# Patient Record
Sex: FEMALE | Race: WHITE | Employment: OTHER | URBAN - METROPOLITAN AREA
[De-identification: names, ages, dates, MRNs, and addresses within clinical notes are randomized per-mention and may not be internally consistent; named-entity substitution may affect disease eponyms.]

---

## 2018-04-05 ENCOUNTER — HOSPITAL ENCOUNTER (OUTPATIENT)
Age: 73
Discharge: HOME | End: 2018-04-07
Payer: MEDICARE

## 2018-04-05 LAB
ALBUMIN SERPL-MCNC: 4.2 G/DL (ref 3.5–5.2)
ALP BLD-CCNC: 59 U/L (ref 35–104)
ALT SERPL-CCNC: 16 U/L (ref 0–32)
ANION GAP SERPL CALCULATED.3IONS-SCNC: 9 MMOL/L (ref 7–16)
AST SERPL-CCNC: 21 U/L (ref 0–31)
BASOPHILS ABSOLUTE: 0.03 E9/L (ref 0–0.2)
BASOPHILS RELATIVE PERCENT: 0.6 % (ref 0–2)
BILIRUB SERPL-MCNC: 0.4 MG/DL (ref 0–1.2)
BUN BLDV-MCNC: 19 MG/DL (ref 8–23)
CALCIUM SERPL-MCNC: 9.5 MG/DL (ref 8.6–10.2)
CHLORIDE BLD-SCNC: 100 MMOL/L (ref 98–107)
CHOLESTEROL, TOTAL: 190 MG/DL (ref 0–199)
CO2: 28 MMOL/L (ref 22–29)
CREAT SERPL-MCNC: 0.7 MG/DL (ref 0.5–1)
EOSINOPHILS ABSOLUTE: 0.23 E9/L (ref 0.05–0.5)
EOSINOPHILS RELATIVE PERCENT: 4.9 % (ref 0–6)
GFR AFRICAN AMERICAN: >60
GFR NON-AFRICAN AMERICAN: >60 ML/MIN/1.73
GLUCOSE BLD-MCNC: 103 MG/DL (ref 74–109)
HCT VFR BLD CALC: 53 % (ref 34–48)
HDLC SERPL-MCNC: 56 MG/DL
HEMOGLOBIN: 16.3 G/DL (ref 11.5–15.5)
IMMATURE GRANULOCYTES #: 0.01 E9/L
IMMATURE GRANULOCYTES %: 0.2 % (ref 0–5)
LDL CHOLESTEROL CALCULATED: 120 MG/DL (ref 0–99)
LYMPHOCYTES ABSOLUTE: 0.99 E9/L (ref 1.5–4)
LYMPHOCYTES RELATIVE PERCENT: 21.1 % (ref 20–42)
MCH RBC QN AUTO: 27.5 PG (ref 26–35)
MCHC RBC AUTO-ENTMCNC: 30.8 % (ref 32–34.5)
MCV RBC AUTO: 89.4 FL (ref 80–99.9)
MONOCYTES ABSOLUTE: 0.28 E9/L (ref 0.1–0.95)
MONOCYTES RELATIVE PERCENT: 6 % (ref 2–12)
NEUTROPHILS ABSOLUTE: 3.15 E9/L (ref 1.8–7.3)
NEUTROPHILS RELATIVE PERCENT: 67.2 % (ref 43–80)
PDW BLD-RTO: 12.5 FL (ref 11.5–15)
PLATELET # BLD: 139 E9/L (ref 130–450)
PMV BLD AUTO: 11.9 FL (ref 7–12)
POTASSIUM SERPL-SCNC: 4.5 MMOL/L (ref 3.5–5)
RBC # BLD: 5.93 E12/L (ref 3.5–5.5)
SODIUM BLD-SCNC: 137 MMOL/L (ref 132–146)
TOTAL PROTEIN: 6.8 G/DL (ref 6.4–8.3)
TRIGL SERPL-MCNC: 70 MG/DL (ref 0–149)
TSH SERPL DL<=0.05 MIU/L-ACNC: 4.24 UIU/ML (ref 0.27–4.2)
VLDLC SERPL CALC-MCNC: 14 MG/DL
WBC # BLD: 4.7 E9/L (ref 4.5–11.5)

## 2018-04-05 PROCEDURE — 80061 LIPID PANEL: CPT

## 2018-04-05 PROCEDURE — 84443 ASSAY THYROID STIM HORMONE: CPT

## 2018-04-05 PROCEDURE — 85025 COMPLETE CBC W/AUTO DIFF WBC: CPT

## 2018-04-05 PROCEDURE — 80053 COMPREHEN METABOLIC PANEL: CPT

## 2018-10-03 ENCOUNTER — HOSPITAL ENCOUNTER (OUTPATIENT)
Age: 73
Discharge: HOME | End: 2018-10-05
Payer: MEDICARE

## 2018-10-03 LAB
ALBUMIN SERPL-MCNC: 3.9 G/DL (ref 3.5–5.2)
ALP BLD-CCNC: 54 U/L (ref 35–104)
ALT SERPL-CCNC: 17 U/L (ref 0–32)
ANION GAP SERPL CALCULATED.3IONS-SCNC: 12 MMOL/L (ref 7–16)
AST SERPL-CCNC: 21 U/L (ref 0–31)
BASOPHILS ABSOLUTE: 0.02 E9/L (ref 0–0.2)
BASOPHILS RELATIVE PERCENT: 0.4 % (ref 0–2)
BILIRUB SERPL-MCNC: 0.5 MG/DL (ref 0–1.2)
BUN BLDV-MCNC: 20 MG/DL (ref 8–23)
CALCIUM SERPL-MCNC: 9.4 MG/DL (ref 8.6–10.2)
CHLORIDE BLD-SCNC: 99 MMOL/L (ref 98–107)
CHOLESTEROL, TOTAL: 154 MG/DL (ref 0–199)
CO2: 28 MMOL/L (ref 22–29)
CREAT SERPL-MCNC: 0.8 MG/DL (ref 0.5–1)
EOSINOPHILS ABSOLUTE: 0.26 E9/L (ref 0.05–0.5)
EOSINOPHILS RELATIVE PERCENT: 5 % (ref 0–6)
GFR AFRICAN AMERICAN: >60
GFR NON-AFRICAN AMERICAN: >60 ML/MIN/1.73
GLUCOSE BLD-MCNC: 92 MG/DL (ref 74–109)
HCT VFR BLD CALC: 39.8 % (ref 34–48)
HDLC SERPL-MCNC: 49 MG/DL
HEMOGLOBIN: 11.9 G/DL (ref 11.5–15.5)
IMMATURE GRANULOCYTES #: 0.01 E9/L
IMMATURE GRANULOCYTES %: 0.2 % (ref 0–5)
LDL CHOLESTEROL CALCULATED: 91 MG/DL (ref 0–99)
LYMPHOCYTES ABSOLUTE: 1.39 E9/L (ref 1.5–4)
LYMPHOCYTES RELATIVE PERCENT: 26.5 % (ref 20–42)
MCH RBC QN AUTO: 26.9 PG (ref 26–35)
MCHC RBC AUTO-ENTMCNC: 29.9 % (ref 32–34.5)
MCV RBC AUTO: 89.8 FL (ref 80–99.9)
MONOCYTES ABSOLUTE: 0.51 E9/L (ref 0.1–0.95)
MONOCYTES RELATIVE PERCENT: 9.7 % (ref 2–12)
NEUTROPHILS ABSOLUTE: 3.05 E9/L (ref 1.8–7.3)
NEUTROPHILS RELATIVE PERCENT: 58.2 % (ref 43–80)
PDW BLD-RTO: 11.7 FL (ref 11.5–15)
PLATELET # BLD: 213 E9/L (ref 130–450)
PMV BLD AUTO: 11.5 FL (ref 7–12)
POTASSIUM SERPL-SCNC: 4.8 MMOL/L (ref 3.5–5)
RBC # BLD: 4.43 E12/L (ref 3.5–5.5)
SODIUM BLD-SCNC: 139 MMOL/L (ref 132–146)
TOTAL PROTEIN: 6.4 G/DL (ref 6.4–8.3)
TRIGL SERPL-MCNC: 70 MG/DL (ref 0–149)
TSH SERPL DL<=0.05 MIU/L-ACNC: 0.01 UIU/ML (ref 0.27–4.2)
VLDLC SERPL CALC-MCNC: 14 MG/DL
WBC # BLD: 5.2 E9/L (ref 4.5–11.5)

## 2018-10-03 PROCEDURE — 85025 COMPLETE CBC W/AUTO DIFF WBC: CPT

## 2018-10-03 PROCEDURE — 84443 ASSAY THYROID STIM HORMONE: CPT

## 2018-10-03 PROCEDURE — 80053 COMPREHEN METABOLIC PANEL: CPT

## 2018-10-03 PROCEDURE — 80061 LIPID PANEL: CPT

## 2019-04-09 ENCOUNTER — HOSPITAL ENCOUNTER (OUTPATIENT)
Age: 74
Discharge: HOME | End: 2019-04-11
Payer: MEDICARE

## 2019-04-09 LAB
T4 FREE: 1.09 NG/DL (ref 0.93–1.7)
TSH SERPL DL<=0.05 MIU/L-ACNC: 1.56 UIU/ML (ref 0.27–4.2)

## 2019-04-09 PROCEDURE — 84443 ASSAY THYROID STIM HORMONE: CPT

## 2019-04-09 PROCEDURE — 84439 ASSAY OF FREE THYROXINE: CPT

## 2019-09-30 ENCOUNTER — HOSPITAL ENCOUNTER (OUTPATIENT)
Age: 74
Discharge: HOME | End: 2019-10-02
Payer: MEDICARE

## 2019-09-30 LAB
ALBUMIN SERPL-MCNC: 4 G/DL (ref 3.5–5.2)
ALP BLD-CCNC: 57 U/L (ref 35–104)
ALT SERPL-CCNC: 13 U/L (ref 0–32)
ANION GAP SERPL CALCULATED.3IONS-SCNC: 16 MMOL/L (ref 7–16)
AST SERPL-CCNC: 19 U/L (ref 0–31)
BASOPHILS ABSOLUTE: 0.03 E9/L (ref 0–0.2)
BASOPHILS RELATIVE PERCENT: 0.6 % (ref 0–2)
BILIRUB SERPL-MCNC: 0.4 MG/DL (ref 0–1.2)
BUN BLDV-MCNC: 16 MG/DL (ref 8–23)
CALCIUM SERPL-MCNC: 9.8 MG/DL (ref 8.6–10.2)
CHLORIDE BLD-SCNC: 101 MMOL/L (ref 98–107)
CHOLESTEROL, TOTAL: 164 MG/DL (ref 0–199)
CO2: 25 MMOL/L (ref 22–29)
CREAT SERPL-MCNC: 0.9 MG/DL (ref 0.5–1)
EOSINOPHILS ABSOLUTE: 0.16 E9/L (ref 0.05–0.5)
EOSINOPHILS RELATIVE PERCENT: 3.3 % (ref 0–6)
GFR AFRICAN AMERICAN: >60
GFR NON-AFRICAN AMERICAN: >60 ML/MIN/1.73
GLUCOSE BLD-MCNC: 102 MG/DL (ref 74–99)
HCT VFR BLD CALC: 39.7 % (ref 34–48)
HDLC SERPL-MCNC: 53 MG/DL
HEMOGLOBIN: 12.2 G/DL (ref 11.5–15.5)
IMMATURE GRANULOCYTES #: 0.01 E9/L
IMMATURE GRANULOCYTES %: 0.2 % (ref 0–5)
LDL CHOLESTEROL CALCULATED: 97 MG/DL (ref 0–99)
LYMPHOCYTES ABSOLUTE: 1.35 E9/L (ref 1.5–4)
LYMPHOCYTES RELATIVE PERCENT: 28 % (ref 20–42)
MCH RBC QN AUTO: 27.5 PG (ref 26–35)
MCHC RBC AUTO-ENTMCNC: 30.7 % (ref 32–34.5)
MCV RBC AUTO: 89.6 FL (ref 80–99.9)
MONOCYTES ABSOLUTE: 0.38 E9/L (ref 0.1–0.95)
MONOCYTES RELATIVE PERCENT: 7.9 % (ref 2–12)
NEUTROPHILS ABSOLUTE: 2.89 E9/L (ref 1.8–7.3)
NEUTROPHILS RELATIVE PERCENT: 60 % (ref 43–80)
PDW BLD-RTO: 13.1 FL (ref 11.5–15)
PLATELET # BLD: 214 E9/L (ref 130–450)
PMV BLD AUTO: 12.3 FL (ref 7–12)
POTASSIUM SERPL-SCNC: 4.9 MMOL/L (ref 3.5–5)
RBC # BLD: 4.43 E12/L (ref 3.5–5.5)
SODIUM BLD-SCNC: 142 MMOL/L (ref 132–146)
T4 FREE: 1.17 NG/DL (ref 0.93–1.7)
TOTAL PROTEIN: 6.7 G/DL (ref 6.4–8.3)
TRIGL SERPL-MCNC: 70 MG/DL (ref 0–149)
TSH SERPL DL<=0.05 MIU/L-ACNC: 0.49 UIU/ML (ref 0.27–4.2)
VLDLC SERPL CALC-MCNC: 14 MG/DL
WBC # BLD: 4.8 E9/L (ref 4.5–11.5)

## 2019-09-30 PROCEDURE — 80061 LIPID PANEL: CPT

## 2019-09-30 PROCEDURE — 85025 COMPLETE CBC W/AUTO DIFF WBC: CPT

## 2019-09-30 PROCEDURE — 80053 COMPREHEN METABOLIC PANEL: CPT

## 2019-09-30 PROCEDURE — 84439 ASSAY OF FREE THYROXINE: CPT

## 2019-09-30 PROCEDURE — 84443 ASSAY THYROID STIM HORMONE: CPT

## 2020-05-12 ENCOUNTER — HOSPITAL ENCOUNTER (OUTPATIENT)
Age: 75
Discharge: HOME | End: 2020-05-14
Payer: MEDICARE

## 2020-05-12 LAB
ALBUMIN SERPL-MCNC: 4.3 G/DL (ref 3.5–5.2)
ALP BLD-CCNC: 58 U/L (ref 35–104)
ALT SERPL-CCNC: 19 U/L (ref 0–32)
ANION GAP SERPL CALCULATED.3IONS-SCNC: 11 MMOL/L (ref 7–16)
AST SERPL-CCNC: 24 U/L (ref 0–31)
BASOPHILS ABSOLUTE: 0.04 E9/L (ref 0–0.2)
BASOPHILS RELATIVE PERCENT: 0.6 % (ref 0–2)
BILIRUB SERPL-MCNC: 0.3 MG/DL (ref 0–1.2)
BUN BLDV-MCNC: 20 MG/DL (ref 8–23)
CALCIUM SERPL-MCNC: 10.2 MG/DL (ref 8.6–10.2)
CHLORIDE BLD-SCNC: 99 MMOL/L (ref 98–107)
CHOLESTEROL, TOTAL: 195 MG/DL (ref 0–199)
CO2: 27 MMOL/L (ref 22–29)
CREAT SERPL-MCNC: 0.9 MG/DL (ref 0.5–1)
EOSINOPHILS ABSOLUTE: 0.26 E9/L (ref 0.05–0.5)
EOSINOPHILS RELATIVE PERCENT: 4.2 % (ref 0–6)
GFR AFRICAN AMERICAN: >60
GFR NON-AFRICAN AMERICAN: >60 ML/MIN/1.73
GLUCOSE BLD-MCNC: 99 MG/DL (ref 74–99)
HCT VFR BLD CALC: 43.1 % (ref 34–48)
HDLC SERPL-MCNC: 59 MG/DL
HEMOGLOBIN: 12.9 G/DL (ref 11.5–15.5)
IMMATURE GRANULOCYTES #: 0.01 E9/L
IMMATURE GRANULOCYTES %: 0.2 % (ref 0–5)
LDL CHOLESTEROL CALCULATED: 115 MG/DL (ref 0–99)
LYMPHOCYTES ABSOLUTE: 1.39 E9/L (ref 1.5–4)
LYMPHOCYTES RELATIVE PERCENT: 22.4 % (ref 20–42)
MCH RBC QN AUTO: 26.7 PG (ref 26–35)
MCHC RBC AUTO-ENTMCNC: 29.9 % (ref 32–34.5)
MCV RBC AUTO: 89 FL (ref 80–99.9)
MONOCYTES ABSOLUTE: 0.44 E9/L (ref 0.1–0.95)
MONOCYTES RELATIVE PERCENT: 7.1 % (ref 2–12)
NEUTROPHILS ABSOLUTE: 4.06 E9/L (ref 1.8–7.3)
NEUTROPHILS RELATIVE PERCENT: 65.5 % (ref 43–80)
PDW BLD-RTO: 12.5 FL (ref 11.5–15)
PLATELET # BLD: 231 E9/L (ref 130–450)
PMV BLD AUTO: 11.9 FL (ref 7–12)
POTASSIUM SERPL-SCNC: 5 MMOL/L (ref 3.5–5)
RBC # BLD: 4.84 E12/L (ref 3.5–5.5)
SODIUM BLD-SCNC: 137 MMOL/L (ref 132–146)
TOTAL PROTEIN: 7.1 G/DL (ref 6.4–8.3)
TRIGL SERPL-MCNC: 104 MG/DL (ref 0–149)
TSH SERPL DL<=0.05 MIU/L-ACNC: 1.21 UIU/ML (ref 0.27–4.2)
VLDLC SERPL CALC-MCNC: 21 MG/DL
WBC # BLD: 6.2 E9/L (ref 4.5–11.5)

## 2020-05-12 PROCEDURE — 84443 ASSAY THYROID STIM HORMONE: CPT

## 2020-05-12 PROCEDURE — 80053 COMPREHEN METABOLIC PANEL: CPT

## 2020-05-12 PROCEDURE — 85025 COMPLETE CBC W/AUTO DIFF WBC: CPT

## 2020-05-12 PROCEDURE — 80061 LIPID PANEL: CPT

## 2021-02-03 ENCOUNTER — IMMUNIZATION (OUTPATIENT)
Dept: PRIMARY CARE CLINIC | Age: 76
End: 2021-02-03
Payer: MEDICARE

## 2021-02-03 PROCEDURE — 0011A COVID-19, MODERNA VACCINE 100MCG/0.5ML DOSE: CPT | Performed by: NURSE PRACTITIONER

## 2021-02-03 PROCEDURE — 91301 COVID-19, MODERNA VACCINE 100MCG/0.5ML DOSE: CPT | Performed by: NURSE PRACTITIONER

## 2021-03-03 ENCOUNTER — IMMUNIZATION (OUTPATIENT)
Dept: PRIMARY CARE CLINIC | Age: 76
End: 2021-03-03
Payer: MEDICARE

## 2021-03-03 PROCEDURE — 91301 COVID-19, MODERNA VACCINE 100MCG/0.5ML DOSE: CPT | Performed by: NURSE PRACTITIONER

## 2021-03-03 PROCEDURE — 0012A COVID-19, MODERNA VACCINE 100MCG/0.5ML DOSE: CPT | Performed by: NURSE PRACTITIONER

## 2021-04-28 ENCOUNTER — HOSPITAL ENCOUNTER (EMERGENCY)
Age: 76
Discharge: HOME | End: 2021-04-28
Attending: EMERGENCY MEDICINE
Payer: MEDICARE

## 2021-04-28 ENCOUNTER — APPOINTMENT (OUTPATIENT)
Dept: GENERAL RADIOLOGY | Age: 76
End: 2021-04-28
Payer: MEDICARE

## 2021-04-28 VITALS
OXYGEN SATURATION: 96 % | WEIGHT: 145 LBS | DIASTOLIC BLOOD PRESSURE: 81 MMHG | RESPIRATION RATE: 14 BRPM | TEMPERATURE: 98.2 F | HEART RATE: 71 BPM | SYSTOLIC BLOOD PRESSURE: 207 MMHG | BODY MASS INDEX: 24.89 KG/M2

## 2021-04-28 DIAGNOSIS — S52.502A CLOSED FRACTURE OF DISTAL END OF LEFT RADIUS, UNSPECIFIED FRACTURE MORPHOLOGY, INITIAL ENCOUNTER: Primary | ICD-10-CM

## 2021-04-28 DIAGNOSIS — I10 ESSENTIAL HYPERTENSION: ICD-10-CM

## 2021-04-28 DIAGNOSIS — Z79.01 CHRONIC ANTICOAGULATION: ICD-10-CM

## 2021-04-28 PROCEDURE — 99285 EMERGENCY DEPT VISIT HI MDM: CPT

## 2021-04-28 PROCEDURE — 6360000002 HC RX W HCPCS: Performed by: EMERGENCY MEDICINE

## 2021-04-28 PROCEDURE — 2500000003 HC RX 250 WO HCPCS: Performed by: EMERGENCY MEDICINE

## 2021-04-28 PROCEDURE — 96375 TX/PRO/DX INJ NEW DRUG ADDON: CPT

## 2021-04-28 PROCEDURE — 6360000002 HC RX W HCPCS: Performed by: PHYSICAL THERAPY ASSISTANT

## 2021-04-28 PROCEDURE — 96374 THER/PROPH/DIAG INJ IV PUSH: CPT

## 2021-04-28 PROCEDURE — 73110 X-RAY EXAM OF WRIST: CPT

## 2021-04-28 PROCEDURE — 73130 X-RAY EXAM OF HAND: CPT

## 2021-04-28 PROCEDURE — 73090 X-RAY EXAM OF FOREARM: CPT

## 2021-04-28 PROCEDURE — 6370000000 HC RX 637 (ALT 250 FOR IP): Performed by: EMERGENCY MEDICINE

## 2021-04-28 RX ORDER — HYDROCODONE BITARTRATE AND ACETAMINOPHEN 5; 325 MG/1; MG/1
1 TABLET ORAL EVERY 4 HOURS PRN
Qty: 18 TABLET | Refills: 0 | Status: SHIPPED | OUTPATIENT
Start: 2021-04-28 | End: 2021-05-01

## 2021-04-28 RX ORDER — DIAPER,BRIEF,INFANT-TODD,DISP
EACH MISCELLANEOUS ONCE
Status: COMPLETED | OUTPATIENT
Start: 2021-04-28 | End: 2021-04-28

## 2021-04-28 RX ORDER — NAPROXEN 500 MG/1
500 TABLET ORAL 2 TIMES DAILY PRN
Qty: 14 TABLET | Refills: 0 | Status: SHIPPED | OUTPATIENT
Start: 2021-04-28 | End: 2021-05-03 | Stop reason: ALTCHOICE

## 2021-04-28 RX ORDER — MORPHINE SULFATE 2 MG/ML
2 INJECTION, SOLUTION INTRAMUSCULAR; INTRAVENOUS ONCE
Status: COMPLETED | OUTPATIENT
Start: 2021-04-28 | End: 2021-04-28

## 2021-04-28 RX ORDER — BUPIVACAINE HYDROCHLORIDE 5 MG/ML
30 INJECTION, SOLUTION EPIDURAL; INTRACAUDAL ONCE
Status: COMPLETED | OUTPATIENT
Start: 2021-04-28 | End: 2021-04-28

## 2021-04-28 RX ORDER — ONDANSETRON 4 MG/1
4 TABLET, ORALLY DISINTEGRATING ORAL EVERY 8 HOURS PRN
Qty: 10 TABLET | Refills: 0 | Status: SHIPPED | OUTPATIENT
Start: 2021-04-28

## 2021-04-28 RX ORDER — LIDOCAINE HYDROCHLORIDE 10 MG/ML
5 INJECTION, SOLUTION INFILTRATION; PERINEURAL ONCE
Status: COMPLETED | OUTPATIENT
Start: 2021-04-28 | End: 2021-04-28

## 2021-04-28 RX ADMIN — BUPIVACAINE HYDROCHLORIDE 150 MG: 5 INJECTION, SOLUTION EPIDURAL; INTRACAUDAL at 05:40

## 2021-04-28 RX ADMIN — LIDOCAINE HYDROCHLORIDE 5 ML: 10 INJECTION, SOLUTION INFILTRATION; PERINEURAL at 05:40

## 2021-04-28 RX ADMIN — FENTANYL CITRATE 50 MCG: 50 INJECTION, SOLUTION INTRAMUSCULAR; INTRAVENOUS at 06:31

## 2021-04-28 RX ADMIN — MORPHINE SULFATE 2 MG: 2 INJECTION, SOLUTION INTRAMUSCULAR; INTRAVENOUS at 02:46

## 2021-04-28 RX ADMIN — BACITRACIN ZINC 1 G: 500 OINTMENT TOPICAL at 02:49

## 2021-04-28 ASSESSMENT — PAIN DESCRIPTION - ORIENTATION: ORIENTATION: LEFT

## 2021-04-28 ASSESSMENT — PAIN SCALES - GENERAL: PAINLEVEL_OUTOF10: 2

## 2021-04-28 ASSESSMENT — PAIN DESCRIPTION - PAIN TYPE: TYPE: ACUTE PAIN

## 2021-04-28 NOTE — ED PROVIDER NOTES
[address] [facility]  Department of Emergency Medicine   ED  Encounter Note  Admit Date/RoomTime: 2021  1:58 AM  ED Room: CATIE/CATIE    NAME: Violette Gomes  : 1945  MRN: [id number]     Chief Complaint:  Fall    History of Present Illness       Violette Gomes is a 68 y.o. old female who presents to the emergency department by ambulance, for traumatic Left wrist pain which occured 1 hour(s) prior to arrival.  The complaint is due to fall from standing [address] Patient has no prior history of pain/injury with regards to today's visit. She is right handed. The patients tetanus status is more than 8years old as she is allergic to it. Since onset the symptoms have been persistent. Her pain is aggraveated by any movement, any use of or pressure on or palpation of and relieved by nothing. She denies any head injury, confusion, dizziness, neck pain, chest pain or abdominal pain. Patient was awoken from sleep with her dog scratching her left shin. She noticed some bleeding from it and is on Xarelto secondary history of A. fib. She states she walked to the bathroom and was attempting to elevate her left leg so she could apply pressure when she lost balance and fell on outstretched hand onto her left wrist.  Positive deformity. Denies hitting her head or loss of consciousness. No prior injuries to the area. Tetanus is significantly out of date secondary to allergy    ROS   Pertinent positives and negatives are stated within HPI, all other systems reviewed and are negative. Past Medical History:  has a past medical history of Arthritis, Asthma, Hypertension, and Osteoporosis. Surgical History:  has a past surgical history that includes Cholecystectomy; Appendectomy; Breast surgery; Breast surgery; Thyroid surgery; joint replacement (); Colonoscopy; and hysteroscopy (2014). Social History:  reports that she quit smoking about 51 years ago.  She has never used smokeless tobacco. She reports that she does not drink alcohol or use drugs. Family History: family history is not on file. Allergies: Other, Tetanus toxoid, and Tetanus toxoids    Physical Exam   Oxygen Saturation Interpretation: Normal.        ED Triage Vitals   BP Temp Temp Source Pulse Resp SpO2 Height Weight   -- 04/28/21 0156 04/28/21 0156 04/28/21 0154 04/28/21 0154 04/28/21 0154 -- 04/28/21 0154    97.8 °F (36.6 °C) Oral 69 16 94 %  145 lb (65.8 kg)         Constitutional:  Alert, development consistent with age. Neck:  Normal ROM. Supple. Non-tender. Wrist:  Left  radial and ulnar aspect. Tenderness:  moderate. Swelling: Moderate. Deformity: yes. ROM: unable to test due to obvious deformity. Skin:  normal exam; no wounds, erythema, or swelling. Neurovascular: Motor deficit: none. Sensory deficit:   none. Pulse deficit: none. Capillary refill: normal.  Elbow: Left              Tenderness: none              Swelling: None. Deformity: no.               ROM: diminished range with pain. Skin:  normal exam; no wounds, erythema, or swelling. Hand: Left              Tenderness: none              Swelling: None. Deformity: no.               ROM: diminished range with pain. Skin:  normal exam; no wounds, erythema, or swelling. Lymphatics: No lymphangitis or adenopathy noted. There is a superficial linear abrasion on the left shin but no laceration for closure  Neurological:  Oriented. Motor functions intact. Lab / Imaging Results   (All laboratory and radiology results have been personally reviewed by myself)  Labs:  No results found for this visit on 04/28/21. Imaging: All Radiology results interpreted by Radiologist unless otherwise noted.   XR WRIST LEFT (MIN 3 VIEWS)   Final Result   Fracture components of the radius are in alignment         XR WRIST LEFT (MIN 3 VIEWS)   Final Result   Comminuted and dorsally angulated fracture involving the distal radius. XR RADIUS ULNA LEFT (2 VIEWS)   Final Result   Comminuted and dorsally angulated fracture involving the distal radius. XR HAND LEFT (MIN 3 VIEWS)   Final Result   Comminuted and dorsally angulated fracture involving the distal radius. ED Course / Medical Decision Making     Medications   morphine (PF) injection 2 mg (2 mg Intravenous Given 4/28/21 0246)   bacitracin zinc ointment (1 g Topical Given 4/28/21 0249)   lidocaine 1 % injection 5 mL (5 mLs Intradermal Given 4/28/21 0540)   bupivacaine (PF) (MARCAINE) 0.5 % injection 150 mg (150 mg Intradermal Given 4/28/21 0540)   fentaNYL (SUBLIMAZE) injection 50 mcg (50 mcg Intravenous Given 4/28/21 0631)        MDM  Patient presents after mechanical fall. Canseco wound was cleaned and dressed. Hematoma block was placed orthopedics was consulted and did perform reduction and splinting. Blood pressure noted, she is due for her morning medications. Is presently asymptomatic from this, will discharge home have her take her morning medicines follow-up with orthopedics return for worsening signs or symptoms. Consult:   Ortho     Procedure(s):    PROCEDURE NOTE    4/28/21       Time: 0510    Hematoma block  Risks, benefits and alternatives (for applicable procedures below) described. Performed By: Nick Posada DO. Indication:   Left wrist fracture  Informed consent: by patient or legal gardian. Location:   left distal radius and ulna  Procedure:  Anesthsetic/anesthsia was obtained using a hematoma block of the affected area using a total of 8 cc mixed 1% lidocaine without and 0.5% bupivacaine without     . Patient tolerated the procedure well. MDM:      Imaging was obtained based on high suspicion for fracture / bony abnormality as per history/physical findings.     Plan of care/Counseling:  I reviewed today's visit with the patient in addition to providing specific details for the plan of care and counseling regarding the diagnosis and prognosis. Questions are answered at this time and are agreeable with the plan. Assessment      1. Closed fracture of distal end of left radius, unspecified fracture morphology, initial encounter    2. Essential hypertension    3. Chronic anticoagulation      Plan   Disposition:   Discharge home. Patient condition is stable    New Medications     Discharge Medication List as of 4/28/2021  7:05 AM      START taking these medications    Details   naproxen (NAPROSYN) 500 MG tablet Take 1 tablet by mouth 2 times daily as needed for Pain, Disp-14 tablet, R-0Print      HYDROcodone-acetaminophen (NORCO) 5-325 MG per tablet Take 1 tablet by mouth every 4 hours as needed for Pain for up to 3 days. Intended supply: 3 days. Take lowest dose possible to manage pain, Disp-18 tablet, R-0Print      ondansetron (ZOFRAN ODT) 4 MG disintegrating tablet Take 1 tablet by mouth every 8 hours as needed for Nausea or Vomiting May substitute for covered product, Disp-10 tablet, R-0Print           Electronically signed by Lori Suarez DO   DD: 4/28/21  **This report was transcribed using voice recognition software. Every effort was made to ensure accuracy; however, inadvertent computerized transcription errors may be present.   END OF ED PROVIDER NOTE       Lori Suarez DO  04/28/21 1950

## 2021-04-28 NOTE — CONSULTS
Department of Orthopedic Surgery  Resident Consult Note  Reason for Consult: Left wrist pain    HISTORY OF PRESENT ILLNESS:       Patient is a 68 y.o. right-hand-dominant female with left wrist pain after mechanical fall from standing height earlier this morning. Patient reports that she was scratched on her leg by her dog. When she lifted her leg to assess the wound she fell over backwards landing onto her out stretched left upper extremity. Patient experienced immediate left wrist pain and noticed an obvious deformity about the wrist.  She denies head trauma or LOC. Denies numbness/tingling/paresthesias. Denies any other orthopedic complaints at this time. Past Medical History:        Diagnosis Date    Arthritis     Asthma     Hypertension     Osteoporosis      Past Surgical History:        Procedure Laterality Date    APPENDECTOMY      BREAST SURGERY      rt benign    BREAST SURGERY      tumors left benign    CHOLECYSTECTOMY      COLONOSCOPY      HYSTEROSCOPY  2/7/2014    hysteroscopy Dilatation and Curretage    JOINT REPLACEMENT  2002    rt and lt knees    THYROID SURGERY      cyst     Current Medications:   No current facility-administered medications for this encounter. Allergies: Other, Tetanus toxoid, and Tetanus toxoids    Social History:   TOBACCO:   reports that she quit smoking about 51 years ago. She has never used smokeless tobacco.  ETOH:   reports no history of alcohol use. DRUGS:   reports no history of drug use. ACTIVITIES OF DAILY LIVING:    OCCUPATION:    Family History:   History reviewed. No pertinent family history.     REVIEW OF SYSTEMS:  CONSTITUTIONAL:  negative for fevers, chills  EYES:  negative for acute changes  HEENT:  negative for acute changes  RESPIRATORY:  negative for dyspnea  CARDIOVASCULAR:  negative for chest pain, palpitations  GASTROINTESTINAL:  negative for nausea, vomiting  GENITOURINARY:  negative for frequency  HEMATOLOGIC/LYMPHATIC:  negative for bleeding and petechiae  MUSCULOSKELETAL:  positive for left wrist pain  NEUROLOGICAL:  negative for head trauma or LOC   BEHAVIOR/PSYCH:  negative for increased agitation and anxiety    PHYSICAL EXAM:    VITALS:  BP (!) 207/81   Pulse 71   Temp 98.2 °F (36.8 °C) (Oral)   Resp 14   Wt 145 lb (65.8 kg)   SpO2 96%   BMI 24.89 kg/m²   CONSTITUTIONAL:  awake, alert, cooperative, no apparent distress, and appears stated age  MUSCULOSKELETAL:  Left upper Extremity:  Skin intact circumferentially  With moderate edema and ecchymosis present about the wrist  +TTP about the wrist  Compartments soft and compressible  +AIN/PIN/Ulnar nerve function intact grossly  +Radial pulse, Brisk Cap refill, hand warm and perfused  Sensation intact to touch in radial/ulnar/median nerve distributions to hand    Secondary Exam:   · Right UE: No obvious signs of trauma. -TTP to fingers, hand, wrist, forearm, elbow, humerus, shoulder or clavicle. -- Patient able to flex/extend fingers, wrist, elbow and shoulder with active and passive ROM without pain    · Bilateral LE: No obvious signs of trauma. -TTP to foot, ankle, leg, knee, thigh, hip.-- Patient able to flex/extend toes, ankle, knee and hip with active and passive ROM without pain    · Pelvis: -TTP, -Log roll, -Heel strike     DATA:    CBC:   Lab Results   Component Value Date    WBC 6.2 05/12/2020    RBC 4.84 05/12/2020    HGB 12.9 05/12/2020    HCT 43.1 05/12/2020    MCV 89.0 05/12/2020    MCH 26.7 05/12/2020    MCHC 29.9 05/12/2020    RDW 12.5 05/12/2020     05/12/2020    MPV 11.9 05/12/2020     PT/INR:  No results found for: PROTIME, INR    Radiology Review:  X-ray left wrist/hand/radius ulna: Extra-articular distal radius fracture with mild radial translation and dorsal angulation. The ring on the ring finger was noted and ED staff was notified immediately to remove ring. No other fractures or dislocations appreciated.     X-ray left wrist-postreduction: Improved alignment of distal radius fracture.     IMPRESSION:  · Left distal radius fracture    PLAN:  · Nonweightbearing left upper extremity  · Patient's fracture will likely be managed operatively on an outpatient basis once soft tissues are more amenable to surgical intervention  · Multimodal pain control per ED staff  · Rest, ice, elevate left upper extremity  · Follow-up with Dr. Canseco Given in 1 week  · Plan to be discussed with attending    All questions were sought and answered during encounter    Electronically signed by Ana Pierre DO on 4/28/2021 at 6:51 AM

## 2021-04-29 ENCOUNTER — OFFICE VISIT (OUTPATIENT)
Dept: ORTHOPEDIC SURGERY | Age: 76
End: 2021-04-29
Payer: MEDICARE

## 2021-04-29 VITALS — BODY MASS INDEX: 24.75 KG/M2 | HEIGHT: 64 IN | WEIGHT: 145 LBS | TEMPERATURE: 98 F

## 2021-04-29 DIAGNOSIS — S52.502A CLOSED FRACTURE OF DISTAL END OF LEFT RADIUS, UNSPECIFIED FRACTURE MORPHOLOGY, INITIAL ENCOUNTER: Primary | ICD-10-CM

## 2021-04-29 PROCEDURE — 99203 OFFICE O/P NEW LOW 30 MIN: CPT | Performed by: ORTHOPAEDIC SURGERY

## 2021-04-29 NOTE — PROGRESS NOTES
Chief Complaint   Patient presents with    Wrist Pain     Left Wrist had a fall DOI 04/28/2021       Janice Obrien is a 68y.o. year old  male who presents for evaluation of left wrist pain. she reports this started 04/28/2021. she does remember a specific injury that started the pain. The injury was direct trauma, fall. The pain is located mainly in the wrist and hand. The pain is worse with activity and better with rest.  The patient has tried reduction and splinting from the ER. The treatment has been effective. The patient is right dominant. The patient is retired. Past Medical History:   Diagnosis Date    Arthritis     Asthma     Hypertension     Osteoporosis      Past Surgical History:   Procedure Laterality Date    APPENDECTOMY      BREAST SURGERY      rt benign    BREAST SURGERY      tumors left benign    CHOLECYSTECTOMY      COLONOSCOPY      HYSTEROSCOPY  2/7/2014    hysteroscopy Dilatation and Curretage    JOINT REPLACEMENT  2002    rt and lt knees    THYROID SURGERY      cyst       Current Outpatient Medications:     naproxen (NAPROSYN) 500 MG tablet, Take 1 tablet by mouth 2 times daily as needed for Pain, Disp: 14 tablet, Rfl: 0    HYDROcodone-acetaminophen (NORCO) 5-325 MG per tablet, Take 1 tablet by mouth every 4 hours as needed for Pain for up to 3 days. Intended supply: 3 days. Take lowest dose possible to manage pain, Disp: 18 tablet, Rfl: 0    ondansetron (ZOFRAN ODT) 4 MG disintegrating tablet, Take 1 tablet by mouth every 8 hours as needed for Nausea or Vomiting May substitute for covered product, Disp: 10 tablet, Rfl: 0    raloxifene (EVISTA) 60 MG tablet, Take 60 mg by mouth daily. , Disp: , Rfl:     montelukast (SINGULAIR) 10 MG tablet, Take 10 mg by mouth nightly., Disp: , Rfl:     atenolol (TENORMIN) 50 MG tablet, Take 50 mg by mouth daily. , Disp: , Rfl:     lisinopril (PRINIVIL;ZESTRIL) 10 MG tablet, Take 10 mg by mouth daily. , Disp: , Rfl:    organization: Not on file     Attends meetings of clubs or organizations: Not on file     Relationship status: Not on file    Intimate partner violence     Fear of current or ex partner: Not on file     Emotionally abused: Not on file     Physically abused: Not on file     Forced sexual activity: Not on file   Other Topics Concern    Not on file   Social History Narrative    Not on file     No family history on file. REVIEW OF SYSTEMS:     General/Constitution:  (-)weight loss, (-)fever, (-)chills, (-)weakness. Skin: (-) rash,(-) psoriasis,(-) eczema, (-)skin cancer. Musculoskeletal: (-) fractures,  (-) dislocations,(-) collagen vascular disease, (-) fibromyalgia, (-) multiple sclerosis, (-) muscular dystrophy, (-) RSD,(-) joint pain (-)swelling, (-) joint pain,swelling. Neurologic: (-) epilepsy, (-)seizures,(-) brain tumor,(-) TIA, (-)stroke, (-)headaches, (-)Parkinson disease,(-) memory loss, (-) LOC. Cardiovascular: (-) Chest pain, (-) swelling in legs/feet, (-) SOB, (-) cramping in legs/feet with walking. Respiratory: (-) SOB, (-) Coughing, (-) night sweats. GI: (-) nausea, (-) vomiting, (-) diarrhea, (-) blood in stool, (-) gastric ulcer. Psychiatric: (-) Depression, (-) Anxiety, (-) bipolar disease, (-) Alzheimer's Disease  Allergic/Immunologic: (-) allergies latex, (-) allergies metal, (-) skin sensitivity. Hematlogic: (-) anemia, (-) blood transfusion, (-) DVT/PE, (-) Clotting disorders      Subjective:    Constitution:  Temp 98 °F (36.7 °C)   Ht 5' 4\" (1.626 m)   Wt 145 lb (65.8 kg)   BMI 24.89 kg/m²     Psycihatric:  The patient is alert and oriented x 3, appears to be stated age and in no distress. Respiratory:  Respiratory effort is not labored. Patient is not gasping. Palpation of the chest reveals no tactile fremitus. Skin:  Upon inspection: the skin appears warm, dry and intact. There is not a previous scar over the affected area. There is not any cellulitis, lymphedema or cutaneous lesions noted in the lower extremities. Upon palpation there is no induration noted. Neurologic:  Motor exam of the upper extremities show: The reflexes in biceps/triceps/brachioradialis are equal and symmetric. Sensory exam C5-T1 are normal bilaterally. Cardiovascular: The vascular exam is normal and is well perfused to distal extremities. There are 2+ radial pulses bilaterally, and motor and sensation is intact to median, ulnar, and radial, musclocutaneus, and axillary nerve distribution and grossly symmetric bilaterally. There is cap refill noted less than two seconds in all digits. There is not edema of the bilateral upper extremities. There is not varicosities noted in the distal extremities. Lymph:  Upon palpation,  there is no lymphadenopathy noted in bilateral upper extremities. Musculoskeletal:  Gait: normal; examination of the nails and digits reveal no cyanosis or clubbing. Cervical Exam:  On physical exam, Sheryl Pena is well-developed, well-nourished, oriented to person, place and time. her gait is normal.  On evaluation of her cervical spine, she has full range of motion of the cervical spine without pain. There is no cervical tenderness to palpation. Shoulder Exam:  On evaluation of her bilaterally upper extremities, her bilateral shoulder has no deformity. There is not evidence of scapular dyskinesis. There is not muscle atrophy in shoulder girdle. The range of motion for the Right Shoulder is 160/45/T8 and for the Left shoulder is 160/45/T8. Right shoulder Motor strength is 5/5 in the supraspinatus, 5/5 internal rotation and 5/5 in external rotation, and Left shoulder motor strength 5/5 in supraspinatus, 5/5 in internal rotation, 5/5 in external rotation. Elbow exam:  Evaluation of the elbow, reveals no signs of swelling or deformity. ROM is 0-150. There is not instability with varus/valgus stresses.   Motor strength is 5/5 with explained treatment options including surgical vs non surgical treatment. I reviewed in detail the risks and benefits and outlined the procedure in detail with expected outcomes and possible complications. I also discussed non surgical treatment such as casting, physical therapy, topical creams and NSAID's. They have elected for surgical management at this time. I discussed the risks and benefits of the procedure with the patient. The risks include but are not limited to: infection, injuries to blood vessels and nerves, non relief of symptoms, need for further operative intervention, blood loss,  DVT/PE, MI and death. The patient understands these risks and wishes to proceed with surgery. I will perform an ORIF of the right distal radius next week. At least 30 minutes was spent discussing the diagnosis and treatment options with the patient with at least 50% of the time was spent with decision making and counseling the patient.

## 2021-05-03 ENCOUNTER — HOSPITAL ENCOUNTER (OUTPATIENT)
Age: 76
Discharge: HOME | End: 2021-05-05
Payer: MEDICARE

## 2021-05-03 DIAGNOSIS — S52.502D CLOSED FRACTURE OF DISTAL END OF LEFT RADIUS WITH ROUTINE HEALING, UNSPECIFIED FRACTURE MORPHOLOGY, SUBSEQUENT ENCOUNTER: ICD-10-CM

## 2021-05-03 PROCEDURE — U0003 INFECTIOUS AGENT DETECTION BY NUCLEIC ACID (DNA OR RNA); SEVERE ACUTE RESPIRATORY SYNDROME CORONAVIRUS 2 (SARS-COV-2) (CORONAVIRUS DISEASE [COVID-19]), AMPLIFIED PROBE TECHNIQUE, MAKING USE OF HIGH THROUGHPUT TECHNOLOGIES AS DESCRIBED BY CMS-2020-01-R: HCPCS

## 2021-05-03 PROCEDURE — U0005 INFEC AGEN DETEC AMPLI PROBE: HCPCS

## 2021-05-03 RX ORDER — METOPROLOL TARTRATE 50 MG/1
50 TABLET, FILM COATED ORAL 2 TIMES DAILY
COMMUNITY

## 2021-05-03 RX ORDER — OMEPRAZOLE 40 MG/1
40 CAPSULE, DELAYED RELEASE ORAL DAILY
COMMUNITY

## 2021-05-03 RX ORDER — METHIMAZOLE 5 MG/1
5 TABLET ORAL EVERY OTHER DAY
COMMUNITY

## 2021-05-04 LAB
SARS-COV-2: NOT DETECTED
SOURCE: NORMAL

## 2021-05-06 ENCOUNTER — ANESTHESIA EVENT (OUTPATIENT)
Dept: OPERATING ROOM | Age: 76
End: 2021-05-06
Payer: MEDICARE

## 2021-05-06 ASSESSMENT — LIFESTYLE VARIABLES: SMOKING_STATUS: 0

## 2021-05-06 NOTE — ANESTHESIA PRE PROCEDURE
Department of Anesthesiology  Preprocedure Note       Name:  Son Syed   Age:  68 y.o.  :  1945                                          MRN:  [id number]         Date:  2021      Surgeon: Anuj Blandon): Sri Daniel DO    Procedure: Procedure(s):  LEFT WRIST OPEN REDUCTION INTERNAL FIXATION DISTAL RADIUS FRACTURE ([address])    Medications prior to admission:   Prior to Admission medications    Medication Sig Start Date End Date Taking? Authorizing Provider   metoprolol tartrate (LOPRESSOR) 50 MG tablet Take 50 mg by mouth 2 times daily   Yes Historical Provider, MD   rivaroxaban (XARELTO) 20 MG TABS tablet Take 20 mg by mouth daily (with breakfast) Stopped 3 days pre op   Yes Historical Provider, MD   methIMAzole (TAPAZOLE) 5 MG tablet Take 5 mg by mouth every other day   Yes Historical Provider, MD   Albuterol Sulfate (PROAIR HFA IN) Inhale into the lungs as needed   Yes Historical Provider, MD   omeprazole (PRILOSEC) 40 MG delayed release capsule Take 40 mg by mouth daily   Yes Historical Provider, MD   raloxifene (EVISTA) 60 MG tablet Take 60 mg by mouth daily. Yes Historical Provider, MD   montelukast (SINGULAIR) 10 MG tablet Take 10 mg by mouth nightly. Yes Historical Provider, MD   lisinopril (PRINIVIL;ZESTRIL) 10 MG tablet Take 10 mg by mouth daily. Yes Historical Provider, MD   cetirizine (ZYRTEC) 10 MG tablet Take 10 mg by mouth daily. Yes Historical Provider, MD   Multiple Vitamins-Minerals (OCUVITE PO) Take 1 capsule by mouth daily. Yes Historical Provider, MD   Cyanocobalamin (VITAMIN B 12 PO) Take 1 tablet by mouth daily. 500mg   Yes Historical Provider, MD   Calcium-Magnesium-Vitamin D (CITRACAL CALCIUM+D PO) Take 2 tablets by mouth daily. Yes Historical Provider, MD   Multiple Vitamins-Minerals (THERAPEUTIC MULTIVITAMIN-MINERALS) tablet Take 1 tablet by mouth daily.    Yes Historical Provider, MD   ondansetron (ZOFRAN ODT) 4 MG disintegrating tablet Take 1 tablet by mouth every 8 hours as needed for Nausea or Vomiting May substitute for covered product 4/28/21   Thania Bonner DO       Current medications:    No current facility-administered medications for this encounter. Current Outpatient Medications   Medication Sig Dispense Refill    metoprolol tartrate (LOPRESSOR) 50 MG tablet Take 50 mg by mouth 2 times daily      rivaroxaban (XARELTO) 20 MG TABS tablet Take 20 mg by mouth daily (with breakfast) Stopped 3 days pre op      methIMAzole (TAPAZOLE) 5 MG tablet Take 5 mg by mouth every other day      Albuterol Sulfate (PROAIR HFA IN) Inhale into the lungs as needed      omeprazole (PRILOSEC) 40 MG delayed release capsule Take 40 mg by mouth daily      raloxifene (EVISTA) 60 MG tablet Take 60 mg by mouth daily.  montelukast (SINGULAIR) 10 MG tablet Take 10 mg by mouth nightly.  lisinopril (PRINIVIL;ZESTRIL) 10 MG tablet Take 10 mg by mouth daily.  cetirizine (ZYRTEC) 10 MG tablet Take 10 mg by mouth daily.  Multiple Vitamins-Minerals (OCUVITE PO) Take 1 capsule by mouth daily.  Cyanocobalamin (VITAMIN B 12 PO) Take 1 tablet by mouth daily. 500mg      Calcium-Magnesium-Vitamin D (CITRACAL CALCIUM+D PO) Take 2 tablets by mouth daily.  Multiple Vitamins-Minerals (THERAPEUTIC MULTIVITAMIN-MINERALS) tablet Take 1 tablet by mouth daily.  ondansetron (ZOFRAN ODT) 4 MG disintegrating tablet Take 1 tablet by mouth every 8 hours as needed for Nausea or Vomiting May substitute for covered product 10 tablet 0       Allergies:     Allergies   Allergen Reactions    Augmentin [Amoxicillin-Pot Clavulanate] Diarrhea    Other      Molds trees dogs cats feathers birds    Tetanus Toxoid     Tetanus Toxoids Other (See Comments)     Heart stopped       Problem List:    Patient Active Problem List   Diagnosis Code    Postmenopausal bleeding N95.0    Endometrial polyp N84.0       Past Medical History:        Diagnosis Date    A-fib ([facility] [address] 2016    no problems since last cardoversion    Arthritis     Asthma     GERD (gastroesophageal reflux disease)     Hypertension     Osteoporosis     Thyroid disease     goiters       Past Surgical History:        Procedure Laterality Date    APPENDECTOMY      BREAST SURGERY      rt benign    BREAST SURGERY      tumors left benign    CARDIOVERSION   & nov 2020    x2    CHOLECYSTECTOMY      COLONOSCOPY      HYSTEROSCOPY  2014    hysteroscopy Dilatation and Curretage    JOINT REPLACEMENT  2002    rt and lt knees    THYROID SURGERY      biopsy & cyst removal benign       Social History:    Social History     Tobacco Use    Smoking status: Former Smoker     Years: 10.00     Types: Cigarettes     Quit date: 1970     Years since quittin.2    Smokeless tobacco: Never Used    Tobacco comment: quit 42 yrs ago   Substance Use Topics    Alcohol use: No                                Counseling given: Not Answered  Comment: quit 42 yrs ago      Vital Signs (Current):   Vitals:    21 1146   Weight: 145 lb (65.8 kg)   Height: 5' 5\" (1.651 m)                                              BP Readings from Last 3 Encounters:   21 (!) 207/81   14 122/60   14 152/74       NPO Status:                                                                                 BMI:   Wt Readings from Last 3 Encounters:   21 145 lb (65.8 kg)   21 145 lb (65.8 kg)   14 125 lb (56.7 kg)     Body mass index is 24.13 kg/m².     CBC:   Lab Results   Component Value Date    WBC 7.0 2021    RBC 4.34 2021    HGB 11.8 2021    HCT 39.6 2021    MCV 91.2 2021    RDW 12.3 2021     2021       CMP:   Lab Results   Component Value Date     2021    K 4.1 2021     2021    CO2 28 2021    BUN 15 2021    CREATININE 0.7 2021    GFRAA >60 2021    LABGLOM >60 2021    GLUCOSE 164 2021    GLUCOSE currently managed with Methimazole Abdominal:           Vascular: negative vascular ROS. Anesthesia Plan      general     ASA 3     (20mg Ketamine for induction  PONV prophylaxis)  Induction: intravenous. MIPS: Postoperative opioids intended and Prophylactic antiemetics administered. Anesthetic plan and risks discussed with patient. Plan discussed with CRNA. PAT Chart Review:  Chart reviewed per routine on May 6, 2021 at 11:49 AM by Krysta Gerard MD.  (Final assessment and plan per day of surgery team.)    Left upper extremity block if patient agrees. Krysta Gerard MD   5/6/2021        DOS STAFF ADDENDUM:    Patient seen and chart reviewed on DOS. No interval change in history or exam.   I agree with the anesthesia pre-operative assessment written above and have made the appropriate addendums and/or changes. Anesthesia plan discussed and risks/benefits addressed. Patient's concerns and questions answered. NPO >8 hours.      Janay Louis DO  May 7, 2021  6:35 AM

## 2021-05-07 ENCOUNTER — HOSPITAL ENCOUNTER (OUTPATIENT)
Age: 76
Setting detail: OUTPATIENT SURGERY
Discharge: HOME | End: 2021-05-07
Attending: ORTHOPAEDIC SURGERY | Admitting: ORTHOPAEDIC SURGERY
Payer: MEDICARE

## 2021-05-07 ENCOUNTER — ANESTHESIA (OUTPATIENT)
Dept: OPERATING ROOM | Age: 76
End: 2021-05-07
Payer: MEDICARE

## 2021-05-07 VITALS
OXYGEN SATURATION: 95 % | SYSTOLIC BLOOD PRESSURE: 127 MMHG | HEIGHT: 65 IN | WEIGHT: 145 LBS | BODY MASS INDEX: 24.16 KG/M2 | DIASTOLIC BLOOD PRESSURE: 57 MMHG | TEMPERATURE: 97 F | HEART RATE: 71 BPM | RESPIRATION RATE: 16 BRPM

## 2021-05-07 VITALS
DIASTOLIC BLOOD PRESSURE: 55 MMHG | OXYGEN SATURATION: 100 % | SYSTOLIC BLOOD PRESSURE: 108 MMHG | RESPIRATION RATE: 32 BRPM

## 2021-05-07 DIAGNOSIS — S52.502A CLOSED FRACTURE OF DISTAL END OF LEFT RADIUS, UNSPECIFIED FRACTURE MORPHOLOGY, INITIAL ENCOUNTER: ICD-10-CM

## 2021-05-07 DIAGNOSIS — S52.502D CLOSED FRACTURE OF DISTAL END OF LEFT RADIUS WITH ROUTINE HEALING, UNSPECIFIED FRACTURE MORPHOLOGY, SUBSEQUENT ENCOUNTER: Primary | ICD-10-CM

## 2021-05-07 DIAGNOSIS — S52.532D CLOSED COLLES' FRACTURE OF LEFT RADIUS WITH ROUTINE HEALING: ICD-10-CM

## 2021-05-07 PROCEDURE — 6360000002 HC RX W HCPCS: Performed by: NURSE ANESTHETIST, CERTIFIED REGISTERED

## 2021-05-07 PROCEDURE — 3600000004 HC SURGERY LEVEL 4 BASE: Performed by: ORTHOPAEDIC SURGERY

## 2021-05-07 PROCEDURE — 3700000001 HC ADD 15 MINUTES (ANESTHESIA): Performed by: ORTHOPAEDIC SURGERY

## 2021-05-07 PROCEDURE — 7100000011 HC PHASE II RECOVERY - ADDTL 15 MIN: Performed by: ORTHOPAEDIC SURGERY

## 2021-05-07 PROCEDURE — 6370000000 HC RX 637 (ALT 250 FOR IP): Performed by: ANESTHESIOLOGY

## 2021-05-07 PROCEDURE — 2500000003 HC RX 250 WO HCPCS: Performed by: NURSE ANESTHETIST, CERTIFIED REGISTERED

## 2021-05-07 PROCEDURE — 7100000001 HC PACU RECOVERY - ADDTL 15 MIN: Performed by: ORTHOPAEDIC SURGERY

## 2021-05-07 PROCEDURE — 3700000000 HC ANESTHESIA ATTENDED CARE: Performed by: ORTHOPAEDIC SURGERY

## 2021-05-07 PROCEDURE — 7100000010 HC PHASE II RECOVERY - FIRST 15 MIN: Performed by: ORTHOPAEDIC SURGERY

## 2021-05-07 PROCEDURE — 2500000003 HC RX 250 WO HCPCS: Performed by: ORTHOPAEDIC SURGERY

## 2021-05-07 PROCEDURE — 7100000000 HC PACU RECOVERY - FIRST 15 MIN: Performed by: ORTHOPAEDIC SURGERY

## 2021-05-07 PROCEDURE — 25609 OPTX DST RD XART FX/EP SEP3+: CPT | Performed by: ORTHOPAEDIC SURGERY

## 2021-05-07 PROCEDURE — 2500000003 HC RX 250 WO HCPCS: Performed by: NURSE PRACTITIONER

## 2021-05-07 PROCEDURE — 2709999900 HC NON-CHARGEABLE SUPPLY: Performed by: ORTHOPAEDIC SURGERY

## 2021-05-07 PROCEDURE — C1769 GUIDE WIRE: HCPCS | Performed by: ORTHOPAEDIC SURGERY

## 2021-05-07 PROCEDURE — 3600000014 HC SURGERY LEVEL 4 ADDTL 15MIN: Performed by: ORTHOPAEDIC SURGERY

## 2021-05-07 PROCEDURE — 2500000003 HC RX 250 WO HCPCS: Performed by: ANESTHESIOLOGY

## 2021-05-07 PROCEDURE — 6360000002 HC RX W HCPCS: Performed by: ANESTHESIOLOGY

## 2021-05-07 PROCEDURE — C1713 ANCHOR/SCREW BN/BN,TIS/BN: HCPCS | Performed by: ORTHOPAEDIC SURGERY

## 2021-05-07 PROCEDURE — 2580000003 HC RX 258: Performed by: ANESTHESIOLOGY

## 2021-05-07 DEVICE — SCREW BNE L16MM DIA3.5MM ANK TI LO PROF: Type: IMPLANTABLE DEVICE | Site: WRIST | Status: FUNCTIONAL

## 2021-05-07 DEVICE — SCREW BNE L14MM DIA3.5MM CORT TI ST NONLOCKING HD LO PROF: Type: IMPLANTABLE DEVICE | Site: WRIST | Status: FUNCTIONAL

## 2021-05-07 DEVICE — SCREW BNE L18MM DIA2.4MM VOLAR DST WRST TI VAR ANG LOK FULL: Type: IMPLANTABLE DEVICE | Site: WRIST | Status: FUNCTIONAL

## 2021-05-07 DEVICE — PLATE BNE L VOLAR DST RAD TI NAR 3 H COMPHSVE: Type: IMPLANTABLE DEVICE | Site: WRIST | Status: FUNCTIONAL

## 2021-05-07 DEVICE — SCREW BNE L20MM DIA2.4MM VOLAR DST WRST TI VAR ANG LOK FULL: Type: IMPLANTABLE DEVICE | Site: WRIST | Status: FUNCTIONAL

## 2021-05-07 DEVICE — SCREW BNE L22MM DIA2.4MM VOLAR DST WRST TI VAR ANG LOK FULL: Type: IMPLANTABLE DEVICE | Site: WRIST | Status: FUNCTIONAL

## 2021-05-07 RX ORDER — CEPHALEXIN 500 MG/1
500 CAPSULE ORAL 3 TIMES DAILY
Qty: 10 CAPSULE | Refills: 0 | Status: SHIPPED | OUTPATIENT
Start: 2021-05-07 | End: 2021-05-11

## 2021-05-07 RX ORDER — FENTANYL CITRATE 50 UG/ML
INJECTION, SOLUTION INTRAMUSCULAR; INTRAVENOUS PRN
Status: DISCONTINUED | OUTPATIENT
Start: 2021-05-07 | End: 2021-05-07 | Stop reason: SDUPTHER

## 2021-05-07 RX ORDER — GLYCOPYRROLATE 1 MG/5 ML
SYRINGE (ML) INTRAVENOUS PRN
Status: DISCONTINUED | OUTPATIENT
Start: 2021-05-07 | End: 2021-05-07 | Stop reason: SDUPTHER

## 2021-05-07 RX ORDER — PROCHLORPERAZINE EDISYLATE 5 MG/ML
5 INJECTION INTRAMUSCULAR; INTRAVENOUS
Status: DISCONTINUED | OUTPATIENT
Start: 2021-05-07 | End: 2021-05-07 | Stop reason: HOSPADM

## 2021-05-07 RX ORDER — BUPIVACAINE HYDROCHLORIDE 5 MG/ML
INJECTION, SOLUTION EPIDURAL; INTRACAUDAL PRN
Status: DISCONTINUED | OUTPATIENT
Start: 2021-05-07 | End: 2021-05-07 | Stop reason: ALTCHOICE

## 2021-05-07 RX ORDER — PROPOFOL 10 MG/ML
INJECTION, EMULSION INTRAVENOUS PRN
Status: DISCONTINUED | OUTPATIENT
Start: 2021-05-07 | End: 2021-05-07 | Stop reason: SDUPTHER

## 2021-05-07 RX ORDER — DIPHENHYDRAMINE HYDROCHLORIDE 50 MG/ML
INJECTION INTRAMUSCULAR; INTRAVENOUS PRN
Status: DISCONTINUED | OUTPATIENT
Start: 2021-05-07 | End: 2021-05-07 | Stop reason: SDUPTHER

## 2021-05-07 RX ORDER — DEXAMETHASONE SODIUM PHOSPHATE 10 MG/ML
INJECTION, SOLUTION INTRAMUSCULAR; INTRAVENOUS PRN
Status: DISCONTINUED | OUTPATIENT
Start: 2021-05-07 | End: 2021-05-07 | Stop reason: SDUPTHER

## 2021-05-07 RX ORDER — METOCLOPRAMIDE HYDROCHLORIDE 5 MG/ML
10 INJECTION INTRAMUSCULAR; INTRAVENOUS ONCE
Status: COMPLETED | OUTPATIENT
Start: 2021-05-07 | End: 2021-05-07

## 2021-05-07 RX ORDER — FENTANYL CITRATE 50 UG/ML
25 INJECTION, SOLUTION INTRAMUSCULAR; INTRAVENOUS EVERY 5 MIN PRN
Status: DISCONTINUED | OUTPATIENT
Start: 2021-05-07 | End: 2021-05-07 | Stop reason: HOSPADM

## 2021-05-07 RX ORDER — MEPERIDINE HYDROCHLORIDE 25 MG/ML
12.5 INJECTION INTRAMUSCULAR; INTRAVENOUS; SUBCUTANEOUS EVERY 5 MIN PRN
Status: DISCONTINUED | OUTPATIENT
Start: 2021-05-07 | End: 2021-05-07 | Stop reason: HOSPADM

## 2021-05-07 RX ORDER — HYDROCODONE BITARTRATE AND ACETAMINOPHEN 5; 325 MG/1; MG/1
1 TABLET ORAL EVERY 6 HOURS PRN
Qty: 28 TABLET | Refills: 0 | Status: SHIPPED | OUTPATIENT
Start: 2021-05-07 | End: 2021-05-14

## 2021-05-07 RX ORDER — MIDAZOLAM HYDROCHLORIDE 1 MG/ML
INJECTION INTRAMUSCULAR; INTRAVENOUS PRN
Status: DISCONTINUED | OUTPATIENT
Start: 2021-05-07 | End: 2021-05-07 | Stop reason: SDUPTHER

## 2021-05-07 RX ORDER — HYDROCODONE BITARTRATE AND ACETAMINOPHEN 5; 325 MG/1; MG/1
1 TABLET ORAL
Status: COMPLETED | OUTPATIENT
Start: 2021-05-07 | End: 2021-05-07

## 2021-05-07 RX ORDER — DIPHENHYDRAMINE HYDROCHLORIDE 50 MG/ML
12.5 INJECTION INTRAMUSCULAR; INTRAVENOUS
Status: DISCONTINUED | OUTPATIENT
Start: 2021-05-07 | End: 2021-05-07 | Stop reason: HOSPADM

## 2021-05-07 RX ORDER — SODIUM CHLORIDE, SODIUM LACTATE, POTASSIUM CHLORIDE, CALCIUM CHLORIDE 600; 310; 30; 20 MG/100ML; MG/100ML; MG/100ML; MG/100ML
INJECTION, SOLUTION INTRAVENOUS CONTINUOUS
Status: DISCONTINUED | OUTPATIENT
Start: 2021-05-07 | End: 2021-05-07 | Stop reason: HOSPADM

## 2021-05-07 RX ORDER — CLINDAMYCIN PHOSPHATE 900 MG/50ML
900 INJECTION INTRAVENOUS ONCE
Status: COMPLETED | OUTPATIENT
Start: 2021-05-07 | End: 2021-05-07

## 2021-05-07 RX ORDER — ONDANSETRON 2 MG/ML
INJECTION INTRAMUSCULAR; INTRAVENOUS PRN
Status: DISCONTINUED | OUTPATIENT
Start: 2021-05-07 | End: 2021-05-07 | Stop reason: SDUPTHER

## 2021-05-07 RX ORDER — KETAMINE HYDROCHLORIDE 50 MG/ML
INJECTION, SOLUTION, CONCENTRATE INTRAMUSCULAR; INTRAVENOUS PRN
Status: DISCONTINUED | OUTPATIENT
Start: 2021-05-07 | End: 2021-05-07 | Stop reason: SDUPTHER

## 2021-05-07 RX ORDER — PHENYLEPHRINE HYDROCHLORIDE 10 MG/ML
INJECTION INTRAVENOUS PRN
Status: DISCONTINUED | OUTPATIENT
Start: 2021-05-07 | End: 2021-05-07 | Stop reason: SDUPTHER

## 2021-05-07 RX ORDER — ACETAMINOPHEN 500 MG
1000 TABLET ORAL ONCE
Status: COMPLETED | OUTPATIENT
Start: 2021-05-07 | End: 2021-05-07

## 2021-05-07 RX ORDER — LIDOCAINE HYDROCHLORIDE 20 MG/ML
INJECTION, SOLUTION EPIDURAL; INFILTRATION; INTRACAUDAL; PERINEURAL PRN
Status: DISCONTINUED | OUTPATIENT
Start: 2021-05-07 | End: 2021-05-07 | Stop reason: SDUPTHER

## 2021-05-07 RX ADMIN — METOCLOPRAMIDE 10 MG: 5 INJECTION, SOLUTION INTRAMUSCULAR; INTRAVENOUS at 07:23

## 2021-05-07 RX ADMIN — FENTANYL CITRATE 100 MCG: 50 INJECTION, SOLUTION INTRAMUSCULAR; INTRAVENOUS at 08:06

## 2021-05-07 RX ADMIN — DIPHENHYDRAMINE HYDROCHLORIDE 12.5 MG: 50 INJECTION, SOLUTION INTRAMUSCULAR; INTRAVENOUS at 09:10

## 2021-05-07 RX ADMIN — SODIUM CHLORIDE, POTASSIUM CHLORIDE, SODIUM LACTATE AND CALCIUM CHLORIDE: 600; 310; 30; 20 INJECTION, SOLUTION INTRAVENOUS at 07:22

## 2021-05-07 RX ADMIN — CLINDAMYCIN PHOSPHATE 900 MG: 18 INJECTION, SOLUTION INTRAVENOUS at 07:46

## 2021-05-07 RX ADMIN — FENTANYL CITRATE 50 MCG: 50 INJECTION, SOLUTION INTRAMUSCULAR; INTRAVENOUS at 07:52

## 2021-05-07 RX ADMIN — LIDOCAINE HYDROCHLORIDE 50 MG: 20 INJECTION, SOLUTION EPIDURAL; INFILTRATION; INTRACAUDAL; PERINEURAL at 07:52

## 2021-05-07 RX ADMIN — Medication 0.2 MG: at 07:49

## 2021-05-07 RX ADMIN — PHENYLEPHRINE HYDROCHLORIDE 100 MCG: 10 INJECTION INTRAVENOUS at 08:11

## 2021-05-07 RX ADMIN — PHENYLEPHRINE HYDROCHLORIDE 50 MCG: 10 INJECTION INTRAVENOUS at 08:06

## 2021-05-07 RX ADMIN — PHENYLEPHRINE HYDROCHLORIDE 100 MCG: 10 INJECTION INTRAVENOUS at 07:56

## 2021-05-07 RX ADMIN — PHENYLEPHRINE HYDROCHLORIDE 100 MCG: 10 INJECTION INTRAVENOUS at 08:28

## 2021-05-07 RX ADMIN — FAMOTIDINE 20 MG: 10 INJECTION, SOLUTION INTRAVENOUS at 07:23

## 2021-05-07 RX ADMIN — KETAMINE HYDROCHLORIDE 20 MG: 50 INJECTION INTRAMUSCULAR; INTRAVENOUS at 07:52

## 2021-05-07 RX ADMIN — DEXAMETHASONE SODIUM PHOSPHATE 10 MG: 10 INJECTION, SOLUTION INTRAMUSCULAR; INTRAVENOUS at 08:08

## 2021-05-07 RX ADMIN — PROPOFOL 130 MG: 10 INJECTION, EMULSION INTRAVENOUS at 07:52

## 2021-05-07 RX ADMIN — MIDAZOLAM 1 MG: 1 INJECTION INTRAMUSCULAR; INTRAVENOUS at 07:42

## 2021-05-07 RX ADMIN — ACETAMINOPHEN 1000 MG: 500 TABLET, COATED ORAL at 07:23

## 2021-05-07 RX ADMIN — HYDROCODONE BITARTRATE AND ACETAMINOPHEN 1 TABLET: 5; 325 TABLET ORAL at 10:41

## 2021-05-07 RX ADMIN — ONDANSETRON 4 MG: 2 INJECTION INTRAMUSCULAR; INTRAVENOUS at 09:10

## 2021-05-07 RX ADMIN — PHENYLEPHRINE HYDROCHLORIDE 50 MCG: 10 INJECTION INTRAVENOUS at 08:00

## 2021-05-07 ASSESSMENT — PULMONARY FUNCTION TESTS
PIF_VALUE: 16
PIF_VALUE: 14
PIF_VALUE: 3
PIF_VALUE: 16
PIF_VALUE: 14
PIF_VALUE: 16
PIF_VALUE: 12
PIF_VALUE: 16
PIF_VALUE: 6
PIF_VALUE: 0
PIF_VALUE: 1
PIF_VALUE: 16
PIF_VALUE: 16
PIF_VALUE: 14
PIF_VALUE: 16
PIF_VALUE: 4
PIF_VALUE: 16
PIF_VALUE: 16
PIF_VALUE: 10
PIF_VALUE: 3
PIF_VALUE: 14
PIF_VALUE: 16
PIF_VALUE: 12
PIF_VALUE: 16
PIF_VALUE: 1
PIF_VALUE: 16
PIF_VALUE: 16
PIF_VALUE: 14
PIF_VALUE: 16
PIF_VALUE: 14
PIF_VALUE: 16
PIF_VALUE: 21
PIF_VALUE: 16

## 2021-05-07 ASSESSMENT — PAIN SCALES - GENERAL
PAINLEVEL_OUTOF10: 0
PAINLEVEL_OUTOF10: 8
PAINLEVEL_OUTOF10: 4
PAINLEVEL_OUTOF10: 0

## 2021-05-07 ASSESSMENT — PAIN - FUNCTIONAL ASSESSMENT: PAIN_FUNCTIONAL_ASSESSMENT: 0-10

## 2021-05-07 ASSESSMENT — PAIN DESCRIPTION - DESCRIPTORS: DESCRIPTORS: ACHING

## 2021-05-07 ASSESSMENT — PAIN DESCRIPTION - PAIN TYPE: TYPE: SURGICAL PAIN

## 2021-05-07 NOTE — ANESTHESIA POSTPROCEDURE EVALUATION
Department of Anesthesiology  Postprocedure Note    Patient: Lissette Gannon  MRN: [id number]  YOB: 1945  Date of evaluation: 5/7/2021  Time:  10:00 AM     Procedure Summary     Date: 05/07/21 Room / Location: [address] [place] [address] 01 / [address] [facility]    Anesthesia Start: 4225 Anesthesia Stop: 0930    Procedure: LEFT WRIST OPEN REDUCTION INTERNAL FIXATION DISTAL RADIUS FRACTURE ([address]) (Left ) Diagnosis: (LEFT DISTAL RADIUS FRACTURE)    Surgeons: Rashaun Cordero DO Responsible Provider: Freddie Vargas DO    Anesthesia Type: general ASA Status: 3          Anesthesia Type: general    Madelyn Phase I: Madelyn Score: 9    Madelyn Phase II:      Last vitals: Reviewed and per EMR flowsheets.        Anesthesia Post Evaluation    Patient location during evaluation: bedside  Patient participation: complete - patient participated  Level of consciousness: awake  Pain score: 5  Airway patency: patent  Nausea & Vomiting: no vomiting and no nausea  Complications: no  Cardiovascular status: hemodynamically stable  Respiratory status: acceptable  Hydration status: stable

## 2021-05-07 NOTE — H&P
50 mg by mouth daily. , Disp: , Rfl:     lisinopril (PRINIVIL;ZESTRIL) 10 MG tablet, Take 10 mg by mouth daily. , Disp: , Rfl:     Albuterol (PROVENTIL IN), Inhale 2 puffs into the lungs as needed. , Disp: , Rfl:     cetirizine (ZYRTEC) 10 MG tablet, Take 10 mg by mouth daily. , Disp: , Rfl:     aspirin 81 MG tablet, Take 81 mg by mouth daily. Last dose 14, Disp: , Rfl:     Multiple Vitamins-Minerals (OCUVITE PO), Take 1 capsule by mouth daily. , Disp: , Rfl:     Cyanocobalamin (VITAMIN B 12 PO), Take 1 tablet by mouth daily. 500mg, Disp: , Rfl:     Cholecalciferol (VITAMIN D3) 3000 UNITS TABS, Take 400 Units by mouth daily. , Disp: , Rfl:     Calcium-Magnesium-Vitamin D (CITRACAL CALCIUM+D PO), Take 2 tablets by mouth daily. , Disp: , Rfl:     Multiple Vitamins-Minerals (THERAPEUTIC MULTIVITAMIN-MINERALS) tablet, Take 1 tablet by mouth daily. , Disp: , Rfl:            Allergies   Allergen Reactions    Other         Molds trees dogs cats feathers birds    Tetanus Toxoid      Tetanus Toxoids Other (See Comments)       Heart stopped      Social History               Socioeconomic History    Marital status:        Spouse name: Not on file    Number of children: Not on file    Years of education: Not on file    Highest education level: Not on file   Occupational History    Not on file   Social Needs    Financial resource strain: Not on file    Food insecurity       Worry: Not on file       Inability: Not on file    Transportation needs       Medical: Not on file       Non-medical: Not on file   Tobacco Use    Smoking status: Former Smoker       Quit date: 1970       Years since quittin.2    Smokeless tobacco: Never Used    Tobacco comment: quit 42 yrs ago   Substance and Sexual Activity    Alcohol use: No    Drug use: No    Sexual activity: Not on file   Lifestyle    Physical activity       Days per week: Not on file       Minutes per session: Not on file    Stress: Not on file 2+ and symmetric bilaterally. Capillary refill is intact and < 2 seconds. Motor strength is 5/5 with flexion and extension of the small finger joints.      Right:  Phallens sign(-), Tinnells sign (-), Median nerve compression test (-),  Finklesteins (-), [facility] Grind test (-), [employer](-). Left:     Phallens sign(-), Tinnells sign (-), Median nerve compression test (-),  Finklesteins (-), [employer] Grind test (-), [employer](-).    Xrays:   Comminuted and dorsally angulated fracture involving the distal radius.     Radiographic findings reviewed with patient     Impression:        Encounter Diagnosis   Name Primary?  Closed fracture of distal end of left radius, unspecified fracture morphology, initial encounter Yes         Plan: Natural history and expected course discussed. Questions answered. Educational materials distributed. Rest, ice, compression, and elevation (RICE) therapy. I had a lengthy discussion with the patient regarding their diagnosis. I explained treatment options including surgical vs non surgical treatment. I reviewed in detail the risks and benefits and outlined the procedure in detail with expected outcomes and possible complications. I also discussed non surgical treatment such as casting, physical therapy, topical creams and NSAID's. They have elected for surgical management at this time. I discussed the risks and benefits of the procedure with the patient. The risks include but are not limited to: infection, injuries to blood vessels and nerves, non relief of symptoms, need for further operative intervention, blood loss,  DVT/PE, MI and death. The patient understands these risks and wishes to proceed with surgery.  I will perform an ORIF of the right distal radius next week.

## 2021-05-07 NOTE — OP NOTE
Operative Note      Patient: Imer Spine  YOB: 1945  MRN: [id number]    Date of Procedure: 5/7/2021    Pre-Op Diagnosis: LEFT DISTAL RADIUS FRACTURE    Post-Op Diagnosis: Same       Procedure(s):  LEFT WRIST OPEN REDUCTION INTERNAL FIXATION DISTAL RADIUS FRACTURE ([address])    Surgeon(s): Yogesh Lynn DO    Assistant:   Surgical Assistant: Bryon Chavez    Anesthesia: General    Estimated Blood Loss (mL): Minimal    Complications: None    Specimens:   * No specimens in log *    Implants:  Implant Name Type Inv. Item Serial No.  Lot No. LRB No. Used Action   PLATE BNE L VOLAR DST RAD TI KLEBER 3 H COMPHSVE  PLATE BNE L VOLAR DST RAD TI KLEBER 3 H COMPHSVE  [employer]  Left 1 Implanted   SCREW BNE L16MM DIA3. 5MM ANK TI LO PROF  SCREW BNE L16MM DIA3. 5MM ANK TI LO PROF  [employer]  Left 2 Implanted   SCREW BNE L22MM DIA2. 4MM VOLAR DST WRST TI ROXANNE ANG SALENA FULL  SCREW BNE L22MM DIA2. 4MM VOLAR DST WRST TI ROXANNE ANG [place] FULL  [employer]  Left 2 Implanted   SCREW BNE L20MM DIA2. 4MM VOLAR DST WRST TI ROXANNE ANG [place] FULL  SCREW BNE L20MM DIA2. 4MM VOLAR DST WRST TI ROXANNE ANG SALENA FULL  [employer]  Left 3 Implanted   SCREW BNE L18MM DIA2. 4MM VOLAR DST WRST TI ROXANNE ANG [place] FULL  SCREW BNE L18MM DIA2. 4MM VOLAR DST WRST TI ROXANNE ANG SALENA FULL  [employer]  Left 1 Implanted   SCREW BNE L14MM DIA3.5MM ROXANNA TI ST NONLOCKING HD LO PROF  SCREW BNE L14MM DIA3.5MM ROXANNA TI ST NONLOCKING HD LO PROF  [employer]  Left 1 Implanted         Drains: * No LDAs found *    Findings: as above    Detailed Description of Procedure:   Below          SURGEON: [employer], D.OFidelina          ASSISTANT:  below      PREOPERATIVE DIAGNOSIS: Closed, displaced, comminuted, left distal radius fracture.      POSTOPERATIVE DIAGNOSIS: Closed, displaced,comminuted,left distal radius fracture.      OPERATION: Open reduction internal fixation of left wrist.      ANESTHESIA: General.      ESTIMATED BLOOD LOSS: Minimal.      COMPLICATIONS: None.      IMPLANT: [employer] distal radial locking plate.      DESCRIPTION OF PROCEDURE: The patient was taken to the operative suite, was  given general anesthesia. The left wrist was identified with preoperative  time-out. The left wrist was then prepped and draped in sterile fashion.      Outlined incision along the volar side of rib over the FCR tendon. I  exsanguinated the limb, inflated tourniquet to 250 mmHg. Made an incision  with #10 blade through skin and subcutaneous tissue. I dissected down to  level of FCR tendon. It was identified and fascia proximal over this was  released. I then retracted the FCR tendon radially to protect the radial  arteries. I then exposed the deep fascia which was incised with #10 blade  using a tenotomy released the fascia proximally and distally to its full  extent. I exposed the underlying quadratus. It was released from the radial  side of the wrist, the distal radius was retracted to the ulnar side. I  exposed the underlying fracture. The fracture was comminuted. It _was in 3  to 4 fragments. It did extend into the radiocarpal as well as to the distal  radioulnar joint. There was greater than 3 fragments. I was able to  anatomically reduce the fracture with traction and volar directed force on  the radius to place it in anatomic position. A guide wire was then  placed over the radial styloid process going from distal to proximal in the  radial styloid to fixate the bone in anatomic position. The plate was then  applied to the volar surface of the bone. The screw in the oblong hole was placed first and  then our height was assessed based on ending fluoroscopy that localized the  best position. The shaft screw in the was then tightened down. Once  the appropriate position was obtained using fluoroscopy, I then placed  distal locking screws under direct visualization.  They were  tightened down to and the fracture remained in anatomic position as far  as the alignment. All fragments were in good position. I then placed our 2  final shaft screws within the plate. I thoroughly irrigated the  wound upon closure. Closed the incisions with 2-0 Vicryl followed by a 3-0  Prolene in a horizontal fashion. And placed in a volar splint, recovered in  the recovery room without difficulty.  The patient tolerated the procedure  well.        Electronically signed by Jessica Rosenthal DO on 5/7/2021 at 10:05 AM

## 2021-05-20 DIAGNOSIS — S52.502A CLOSED FRACTURE OF DISTAL END OF LEFT RADIUS, UNSPECIFIED FRACTURE MORPHOLOGY, INITIAL ENCOUNTER: Primary | ICD-10-CM

## 2021-05-21 ENCOUNTER — OFFICE VISIT (OUTPATIENT)
Dept: ORTHOPEDIC SURGERY | Age: 76
End: 2021-05-21

## 2021-05-21 VITALS — BODY MASS INDEX: 24.16 KG/M2 | TEMPERATURE: 98 F | HEIGHT: 65 IN | WEIGHT: 145 LBS

## 2021-05-21 DIAGNOSIS — S52.502A CLOSED FRACTURE OF DISTAL END OF LEFT RADIUS, UNSPECIFIED FRACTURE MORPHOLOGY, INITIAL ENCOUNTER: Primary | ICD-10-CM

## 2021-05-21 PROCEDURE — 99024 POSTOP FOLLOW-UP VISIT: CPT | Performed by: NURSE PRACTITIONER

## 2021-05-21 NOTE — PROGRESS NOTES
Ms. Mary Chu returns today for follow-up of a left distal radius fracture which was treated with ORIF. Date of surgery was 5/7/2021. she reports no pain. Physical Exam:  LUE - Skin intact with staples         Nontender to palpation at the area of the fracture site. ROM   limited         The incision is healing well without evidence of infection. Pulses are intact and symmetric bilaterally         Strength limited         Sensation intact, + EPL    Xrays:s/p ORIF with good anatomic alignment    Radiographic findings reviewed with patient    Impression:   Encounter Diagnosis   Name Primary?     Closed fracture of distal end of left radius, unspecified fracture morphology, initial encounter Yes         Plan:   Staples removed  nwb  velcro brace  HEP  OT  FU in 4 weeks with xr

## 2021-05-24 ENCOUNTER — EVALUATION (OUTPATIENT)
Dept: OCCUPATIONAL THERAPY | Age: 76
End: 2021-05-24
Payer: MEDICARE

## 2021-05-24 DIAGNOSIS — S52.532A CLOSED COLLES' FRACTURE OF LEFT RADIUS, INITIAL ENCOUNTER: Primary | ICD-10-CM

## 2021-05-24 PROCEDURE — 97110 THERAPEUTIC EXERCISES: CPT | Performed by: OCCUPATIONAL THERAPIST

## 2021-05-24 PROCEDURE — 97165 OT EVAL LOW COMPLEX 30 MIN: CPT | Performed by: OCCUPATIONAL THERAPIST

## 2021-05-24 NOTE — PROGRESS NOTES
OCCUPATIONAL THERAPY INITIAL EVALUATION    [facility] [place]  [facility] OCCUPATIONAL THERAPY   [address] [facility] [address]  Dept: [address] OT Fax: [phone]    Date:  2021  Initial Evaluation Date: 2021   Evaluating Therapist: Syeda Rendon OT    Patient Name:  Ketty De La Garza    :  1945    Restrictions/Precautions:   Follow ORIF DRF protocol , low fall risk  Diagnosis:  L DRF  ORIF   s52.502A       Date of Surgery/Injury: sx      Insurance/Certification information:  [employer]  Plan of care signed (Y/N): N  Visit# / total visits:     Referring Practitioner:  Dr. De Jesus  Specific Practitioner Orders: NWB, velcro brace - given by ,  ROM as tolerated    Assessment of current deficits   [] Functional mobility  [x] ADLs  [x] Strength   [] Cognition   [] Functional transfers   [] IADLs  [] Safety Awareness  [x] Endurance   [] Fine Motor Coordination  [] Balance  [] Vision/perception  [] Sensation    [] Gross Motor Coordination [x] ROM  [x] Pain   [x] Edema    [x] Scar Adhesion/Skin Integrity     OT PLAN OF CARE   OT POC based on physician orders, patient diagnosis and results of clinical assessment    Frequency/Duration:  1-2 x's a week for 12 sessions  Specific OT Treatment to include:     [x] Instruction in HEP                   Modalities:  [x] Therapeutic Exercise        [x] Ultrasound               [] Electrical Stimulation/Attended  [x] PROM/Stretching                    [x] Fluidotherapy          [x]  Paraffin                   [] AAROM  [x] AROM                 [] Iontophoresis:   [x] Tendon Glides                                               [] Neuromuscular Re-Ed            [] ADL/IADL re-training    [x] Therapeutic Activity       [x] Pain Management with/without modalities PRN                 [x] Manual Therapy                      [] Splinting                      [x] Scar Management                   []Joint Protection/Training  []Ergonomics                             [x] Joint Mobilization        [] Adaptive Equipment Assessment/Training                             [x] Manual Edema Mobilization   [] Myofascial Release                 [] Energy Conservation/Work Simplification  [x] GM/FM Coordination       [] Safety retraining/education per  individual diagnosis/goals  [x] Desensitization       Patient Specific Goal: To use her L hand again and get good motion. GOALS (Long term same as Short term):  1. ) Patient will demonstrate good understanding of home program (exercises/activities/diagnosis/prognosis/goals) with good accuracy. 2. ) Patient will demonstrate increased active/passive range of motion of their Left wrist and forearm to [facility] for ADL/IADL completion. 3. ) Patient will demonstrate increased /pinch strength of at least 10 / 5 pinch pounds of their Left hand. 4. ) Patient to report decreased pain in their affected Left  upper extremity from 5/10 to 2/10 or less with resistive functional use. 5. ) Patient to report 100% compliance with their splint wear, care, and precautions if needed. 6. ) Patient will be knowledgeable of edema control techniques as evident with decreases from slight  To none. 7. ) Patient will demonstrate a non-tender/non-adherent scar. 8. ) Patient will report ADL functions as Mod I/I using L UE .   9. ) Patient will decrease QuickDASH score to 30% or less for increased participation in daily functional activities.        Past Medical History:   Past Medical History:   Diagnosis Date    A-fib ([address] 2016    no problems since last cardoversion    Arthritis     Asthma     GERD (gastroesophageal reflux disease)     Hypertension     Osteoporosis     Thyroid disease     goiters     Past Surgical History:   Past Surgical History:   Procedure Laterality Date    APPENDECTOMY      BREAST SURGERY      rt benign    BREAST SURGERY      tumors left benign    CARDIOVERSION  2016 & nov 2020    x2    CHOLECYSTECTOMY      COLONOSCOPY      HYSTEROSCOPY  02/07/2014    hysteroscopy Dilatation and Curretage    JOINT REPLACEMENT  2002    rt and lt knees    OTHER SURGICAL HISTORY Left 05/07/2021    Left wrist open reduction internal fixation    THYROID SURGERY      biopsy & cyst removal benign    WRIST FRACTURE SURGERY Left 5/7/2021    LEFT WRIST OPEN REDUCTION INTERNAL FIXATION DISTAL RADIUS FRACTURE ([address]) performed by Ketty Garland DO at [address] [place] [address]       Reason for Referral: Pt enters clinic today wearing the prefab wrist brace she received at the Dr's office on 5/21/2021. She states she has been wearing it all the time. She had a fall at home 0n 4/29/2021 when she fractured her L wrist .  She had sx on 5/7/2021 - ORIF and had her cast removed and stiches removed last week. She states her pain has been low. Home Living: Lives in a house with her  who has some physical limiations 2* to Parkinsons Disease. 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 She is doing all tasks with her R arm only. She is retired. Hobbies include quilting. She does have to help her  on occasion to get dressed but he is I showering and grooming and feeding himself. ADL STATUS:   Ind Mod I Min A Mod A Max A Dep Other   Feeding: x         Grooming: x         Bathing: x         UE Dressing:  x        LE Dressing:  x        Toileting: x         Transfers: x           Comments: Pt is R hand dominant so is using this arm alone for ADL tasks. Pain Level: Pain at rest is 0/10. Pain with light activity with brace on 0 - 3/10.   When by accident lifting with her L arm with her brace on 5/10 - and then she stopped. She has had to take nothing for pain. UE Assessment:  Pt has AROM WNLs in her R shoulder, elbow , fingers and thumb. Limitations as follows:    Left  Upper Extremity  AROM    FOREARM Pronation 80-90* 62* R 70*       Supination 80-90* WNL        WRIST Flexion 0-70* 40*       Extension 0-80* 33*       Radial Deviation 0-20* 18*       Ulnar Deviation 0-30* 28*          Comment: Hand Dominance is Right     Sensation: WNL's    Edema Description/Circumferential Measurements:   Pt has slight edema in her L wrist.    Circum. Mease  Wrist   R - 14.4 cm                                          L  - 15 cm     Dynamometer (setting 2):     Left: NT      Right: NT      Pinch Meter:   Lateral: Left= NT,   Right= NT    Palmar 3 point: Left= NT,    Right= NT    9 Hole Peg Test:   Left: :29   Right:  :26    QuickDASH Score: 52% disability    Intervention: Pt was given a HEP - see attached sheet. This included AROM for wrsit and forearm in all planes. She was fitted with a size C compression sleeve to wear as needed with her brace to decrease edema. She was instructed to do exercises with stretch only and no pain. She was instructed to use her L arm for light tasks only with her brace on. She was instructed in scar massage to do 2x's a day. She appeared to understand all instructions. Goals were mutually agreed upon with the pt. Eval Complexity: LOW level eval charged,, there exer x's 1  Profile and History- history from pt and physician notes. Assessment of Occupational Performance and Identification of Deficits- Pt has 6 functional deficits.     Clinical Decision Making- Pt has OA ( L thumb),     Rehab Potential:                                 [x] Good  [] Fair  [] Poor        Suggested Professional Referral:       [x] No  [] Yes:  Barriers to Goal Achievement[de-identified]          [x] No  [] Yes:  Domestic Concerns:                           [x] No  []

## 2021-06-02 ENCOUNTER — TREATMENT (OUTPATIENT)
Dept: OCCUPATIONAL THERAPY | Age: 76
End: 2021-06-02
Payer: MEDICARE

## 2021-06-02 DIAGNOSIS — S52.532A CLOSED COLLES' FRACTURE OF LEFT RADIUS, INITIAL ENCOUNTER: Primary | ICD-10-CM

## 2021-06-02 PROCEDURE — 97530 THERAPEUTIC ACTIVITIES: CPT | Performed by: OCCUPATIONAL THERAPY ASSISTANT

## 2021-06-02 PROCEDURE — 97110 THERAPEUTIC EXERCISES: CPT | Performed by: OCCUPATIONAL THERAPY ASSISTANT

## 2021-06-02 PROCEDURE — 97140 MANUAL THERAPY 1/> REGIONS: CPT | Performed by: OCCUPATIONAL THERAPY ASSISTANT

## 2021-06-02 PROCEDURE — 97018 PARAFFIN BATH THERAPY: CPT | Performed by: OCCUPATIONAL THERAPY ASSISTANT

## 2021-06-02 NOTE — PROGRESS NOTES
Scar Management                   []?Joint Protection/Training  []? Ergonomics                             [x]?  Joint Mobilization                      []? Adaptive Equipment Assessment/Training                             [x]?  Manual Edema Mobilization   []? Myofascial Release                 []? Energy Conservation/Work Simplification  [x]? GM/FM Coordination                []? Safety retraining/education per  individual diagnosis/goals  [x]? Desensitization        Patient Specific Goal: To use her L hand again and get good motion.                              GOALS (Long term same as Short term):  1. ) Patient will demonstrate good understanding of home program (exercises/activities/diagnosis/prognosis/goals) with good accuracy. 2. ) Patient will demonstrate increased active/passive range of motion of their Left wrist and forearm to [facility] for ADL/IADL completion. 3. ) Patient will demonstrate increased /pinch strength of at least 10 / 5 pinch pounds of their Left hand. 4. ) Patient to report decreased pain in their affected Left  upper extremity from 5/10 to 2/10 or less with resistive functional use. 5. ) Patient to report 100% compliance with their splint wear, care, and precautions if needed. 6. ) Patient will be knowledgeable of edema control techniques as evident with decreases from slight  To none. 7. ) Patient will demonstrate a non-tender/non-adherent scar. 8. ) Patient will report ADL functions as Mod I/I using L UE .   9. ) Patient will decrease QuickDASH score to 30% or less for increased participation in daily functional activities. Subjective: \"I have been doing my exercises. \"      Objective:  Updated POC to be completed by 10th visit     INTERVENTION: COMPLETED: SPECIFICS/COMMENTS:   Modality:     Paraffin Bath  x 10 mins         AROM:     L wrist and forearm  X  X  x All planes   -Jux-a-cisor   -blue ball- pronation/supination         AAROM:               PROM/Stretching:

## 2021-06-07 ENCOUNTER — TREATMENT (OUTPATIENT)
Dept: OCCUPATIONAL THERAPY | Age: 76
End: 2021-06-07
Payer: MEDICARE

## 2021-06-07 DIAGNOSIS — S52.532A CLOSED COLLES' FRACTURE OF LEFT RADIUS, INITIAL ENCOUNTER: Primary | ICD-10-CM

## 2021-06-07 PROCEDURE — 97110 THERAPEUTIC EXERCISES: CPT | Performed by: OCCUPATIONAL THERAPY ASSISTANT

## 2021-06-07 PROCEDURE — 97018 PARAFFIN BATH THERAPY: CPT | Performed by: OCCUPATIONAL THERAPY ASSISTANT

## 2021-06-07 PROCEDURE — 97140 MANUAL THERAPY 1/> REGIONS: CPT | Performed by: OCCUPATIONAL THERAPY ASSISTANT

## 2021-06-07 PROCEDURE — 97530 THERAPEUTIC ACTIVITIES: CPT | Performed by: OCCUPATIONAL THERAPY ASSISTANT

## 2021-06-07 NOTE — PROGRESS NOTES
Scar Management                   []?Joint Protection/Training  []? Ergonomics                             [x]?  Joint Mobilization                      []? Adaptive Equipment Assessment/Training                             [x]?  Manual Edema Mobilization   []? Myofascial Release                 []? Energy Conservation/Work Simplification  [x]? GM/FM Coordination                []? Safety retraining/education per  individual diagnosis/goals  [x]? Desensitization        Patient Specific Goal: To use her L hand again and get good motion.                              GOALS (Long term same as Short term):  1. ) Patient will demonstrate good understanding of home program (exercises/activities/diagnosis/prognosis/goals) with good accuracy. 2. ) Patient will demonstrate increased active/passive range of motion of their Left wrist and forearm to [facility] for ADL/IADL completion. 3. ) Patient will demonstrate increased /pinch strength of at least 10 / 5 pinch pounds of their Left hand. 4. ) Patient to report decreased pain in their affected Left  upper extremity from 5/10 to 2/10 or less with resistive functional use. 5. ) Patient to report 100% compliance with their splint wear, care, and precautions if needed. 6. ) Patient will be knowledgeable of edema control techniques as evident with decreases from slight  To none. 7. ) Patient will demonstrate a non-tender/non-adherent scar. 8. ) Patient will report ADL functions as Mod I/I using L UE .   9. ) Patient will decrease QuickDASH score to 30% or less for increased participation in daily functional activities.        Subjective: \"I have been doing more with my hand lately\"      Objective:  Updated POC to be completed by 10th visit     INTERVENTION: COMPLETED: SPECIFICS/COMMENTS:   Modality:     Paraffin Bath  x 10 mins         AROM:     L wrist and forearm  X  X  X  x All planes   -Jux-a-cisor (6 reps)  -blue ball- pronation/supination   -bean transfer with spoon to increase forearm supination/pronation         AAROM:               PROM/Stretching:     L wrist and forearm  X  x All planes  -prayer stretches- 5 reps with 20 sec holds. Scar Mass/Edema Control:     Scar massage  x         Strengthening:               Other: HEP     Wrist AROM ex's x    Prayer stretches x      Assessment/Comments: Pt is making Fair + progress toward stated plan of care. Pt. Tolerated all exercises and activities. Pt. Has follow up on June 17 with doctor and will start light strengthening if doctor approves at 6 weeks. -Rehab Potential: Good  -Requires OT Follow Up: Yes  Time In: 1400    Time Out: 1500             Visit #:3    Treatment Charges: Mins Units   Modalities paraffin 10 1   Ther Exercise 20 1   Manual Therapy 15 1   Thera Activities 10 1   ADL/Home Mgt      Neuro Re-education     Group Therapy     Non-Billable Service Time     Other     Total Time/Units 55 4       -Response to Treatment: Fair+ Pt. Is happy that her pain levels are minimal with exercise. Goals: Goals for pt can be see on initial eval occurring on  5/24/21    Plan:   [x]  Continues Plan of care: Focus on AROM and functional use of L hand. Pt education continues at each visit to obtain maximum benefits from skilled OT intervention.   []  [address] of care:   []  Discharge:      Cierra BENDER/ESTELA [id number]

## 2021-06-14 ENCOUNTER — TREATMENT (OUTPATIENT)
Dept: OCCUPATIONAL THERAPY | Age: 76
End: 2021-06-14
Payer: MEDICARE

## 2021-06-14 DIAGNOSIS — S52.532A CLOSED COLLES' FRACTURE OF LEFT RADIUS, INITIAL ENCOUNTER: Primary | ICD-10-CM

## 2021-06-14 PROCEDURE — 97140 MANUAL THERAPY 1/> REGIONS: CPT | Performed by: OCCUPATIONAL THERAPY ASSISTANT

## 2021-06-14 PROCEDURE — 97110 THERAPEUTIC EXERCISES: CPT | Performed by: OCCUPATIONAL THERAPY ASSISTANT

## 2021-06-14 PROCEDURE — 97530 THERAPEUTIC ACTIVITIES: CPT | Performed by: OCCUPATIONAL THERAPY ASSISTANT

## 2021-06-14 PROCEDURE — 97018 PARAFFIN BATH THERAPY: CPT | Performed by: OCCUPATIONAL THERAPY ASSISTANT

## 2021-06-14 NOTE — PROGRESS NOTES
OCCUPATIONAL THERAPY PROGRESS NOTE    Date:  2021  Initial Evaluation Date: 21     Patient Name:  Tenzin Avalos    :  1945  Restrictions/Precautions: Follow ORIF DRF protocol , low fall risk  Diagnosis:  L DRF  ORIF   s52.502A                                                           Date of Surgery/Injury: sx 2021      Insurance/Certification information:  [employer]na Medicare  Plan of care signed (Y/N): N  Visit# / total visits:     Pain Level: 1 on scale of 1-10, aching     Referring Practitioner:  Dr. Vito Bosch  Specific Practitioner Orders: NWB, velcro brace - given by ,  ROM as tolerated     Assessment of current deficits   []? Functional mobility             [x]? ADLs          [x]? Strength                  []? Cognition   []? Functional transfers           []? IADLs         []? Safety Awareness  [x]? Endurance   []? Fine Motor Coordination    []? Balance      []? Vision/perception   []? Sensation     []? Gross Motor Coordination [x]? ROM           [x]? Pain                        [x]? Edema          [x]? Scar Adhesion/Skin Integrity      OT PLAN OF CARE   OT POC based on physician orders, patient diagnosis and results of clinical assessment     Frequency/Duration:  1-2 x's a week for 12 sessions  Specific OT Treatment to include:      [x]? Instruction in HEP                   Modalities:  [x]?  Therapeutic Exercise                 [x]? Ultrasound               []? Electrical Stimulation/Attended  [x]? PROM/Stretching                    [x]? Fluidotherapy          [x]?   Paraffin                   []? AAROM  [x]?  AROM                 []? Iontophoresis:   [x]? Alberto Muscat                                       []? Neuromuscular Re-Ed            []? ADL/IADL re-training    [x]?  Therapeutic Activity                  [x]? Pain Management with/without modalities PRN                 [x]?  Manual Therapy                      []? Splinting                                   [x]? Scar Management                   []?Joint Protection/Training  []? Ergonomics                             [x]?  Joint Mobilization                      []? Adaptive Equipment Assessment/Training                             [x]?  Manual Edema Mobilization   []? Myofascial Release                 []? Energy Conservation/Work Simplification  [x]? GM/FM Coordination                []? Safety retraining/education per  individual diagnosis/goals  [x]? Desensitization        Patient Specific Goal: To use her L hand again and get good motion.                              GOALS (Long term same as Short term):  1. ) Patient will demonstrate good understanding of home program (exercises/activities/diagnosis/prognosis/goals) with good accuracy. 2. ) Patient will demonstrate increased active/passive range of motion of their Left wrist and forearm to [facility] for ADL/IADL completion. 3. ) Patient will demonstrate increased /pinch strength of at least 10 / 5 pinch pounds of their Left hand. 4. ) Patient to report decreased pain in their affected Left  upper extremity from 5/10 to 2/10 or less with resistive functional use. 5. ) Patient to report 100% compliance with their splint wear, care, and precautions if needed. 6. ) Patient will be knowledgeable of edema control techniques as evident with decreases from slight  To none. 7. ) Patient will demonstrate a non-tender/non-adherent scar. 8. ) Patient will report ADL functions as Mod I/I using L UE .   9. ) Patient will decrease QuickDASH score to 30% or less for increased participation in daily functional activities. Subjective: \"I did some quilting this weekend. \"      Objective:  Updated POC to be completed by 10th visit     INTERVENTION: COMPLETED: SPECIFICS/COMMENTS:   Modality:     Paraffin Bath  x 10 mins         AROM:     L wrist and forearm  X  X  X    x All planes   -Jux-a-cisor (6 reps)  -blue ball- pronation/supination   -bean transfer with spoon to increase forearm supination/pronation  -Exercise spheres. AAROM:               PROM/Stretching:     L wrist and forearm  X  x All planes  -prayer stretches- 5 reps with 20 sec holds. Scar Mass/Edema Control:     Scar massage  x         Strengthening:     L hand strengthening  x Soft (beige theraputty) - - 20 reps and roll/pinch- 3 times         Other: HEP     Wrist AROM ex's x    Prayer stretches x      Assessment/Comments: Pt is making Fair + progress toward stated plan of care. Pt. Tolerated new resistive exercises for L hand only today. Will continue to progress as tolerated. -Rehab Potential: Good  -Requires OT Follow Up: Yes  Time In: 1400    Time Out: 1500             Visit #:4    Treatment Charges: Mins Units   Modalities paraffin 10 1   Ther Exercise 20 1   Manual Therapy 15 1   Thera Activities 10 1   ADL/Home Mgt      Neuro Re-education     Group Therapy     Non-Billable Service Time     Other     Total Time/Units 55 4       -Response to Treatment: Fair+ Pt. Is happy that her pain levels are minimal with exercise. Goals: Goals for pt can be see on initial eval occurring on  5/24/21    Plan:   [x]  Continues Plan of care: Focus on AROM and functional use of L hand. Pt education continues at each visit to obtain maximum benefits from skilled OT intervention.   []  [address] [facility] of care:   []  Discharge:      Aliza BENDER/ESTELA [id number]

## 2021-06-16 DIAGNOSIS — S52.502A CLOSED FRACTURE OF DISTAL END OF LEFT RADIUS, UNSPECIFIED FRACTURE MORPHOLOGY, INITIAL ENCOUNTER: Primary | ICD-10-CM

## 2021-06-17 ENCOUNTER — OFFICE VISIT (OUTPATIENT)
Dept: ORTHOPEDIC SURGERY | Age: 76
End: 2021-06-17

## 2021-06-17 VITALS — HEIGHT: 65 IN | WEIGHT: 143 LBS | BODY MASS INDEX: 23.82 KG/M2

## 2021-06-17 DIAGNOSIS — S52.502A CLOSED FRACTURE OF DISTAL END OF LEFT RADIUS, UNSPECIFIED FRACTURE MORPHOLOGY, INITIAL ENCOUNTER: Primary | ICD-10-CM

## 2021-06-17 PROCEDURE — 99024 POSTOP FOLLOW-UP VISIT: CPT | Performed by: ORTHOPAEDIC SURGERY

## 2021-06-21 ENCOUNTER — TREATMENT (OUTPATIENT)
Dept: OCCUPATIONAL THERAPY | Age: 76
End: 2021-06-21
Payer: MEDICARE

## 2021-06-21 DIAGNOSIS — S52.532A CLOSED COLLES' FRACTURE OF LEFT RADIUS, INITIAL ENCOUNTER: Primary | ICD-10-CM

## 2021-06-21 PROCEDURE — 97530 THERAPEUTIC ACTIVITIES: CPT | Performed by: OCCUPATIONAL THERAPY ASSISTANT

## 2021-06-21 PROCEDURE — 97140 MANUAL THERAPY 1/> REGIONS: CPT | Performed by: OCCUPATIONAL THERAPY ASSISTANT

## 2021-06-21 PROCEDURE — 97110 THERAPEUTIC EXERCISES: CPT | Performed by: OCCUPATIONAL THERAPY ASSISTANT

## 2021-06-21 PROCEDURE — 97018 PARAFFIN BATH THERAPY: CPT | Performed by: OCCUPATIONAL THERAPY ASSISTANT

## 2021-06-21 NOTE — PROGRESS NOTES
OCCUPATIONAL THERAPY PROGRESS NOTE    Date:  2021  Initial Evaluation Date: 21     Patient Name:  Jennifer Kiser    :  1945  Restrictions/Precautions: Follow ORIF DRF protocol , low fall risk  Diagnosis:  L DRF  ORIF   s52.502A                                                           Date of Surgery/Injury: sx 2021      Insurance/Certification information:  [employer] Medicare  Plan of care signed (Y/N): N  Visit# / total visits:     Pain Level: 1 on scale of 1-10, aching     Referring Practitioner:  Dr. Tulio Cee  Specific Practitioner Orders: NWB, velcro brace - given by ,  ROM as tolerated, Initiate light strengthening today.      Assessment of current deficits   []? Functional mobility             [x]? ADLs          [x]? Strength                  []? Cognition   []? Functional transfers           []? IADLs         []? Safety Awareness  [x]? Endurance   []? Fine Motor Coordination    []? Balance      []? Vision/perception   []? Sensation     []? Gross Motor Coordination [x]? ROM           [x]? Pain                        [x]? Edema          [x]? Scar Adhesion/Skin Integrity      OT PLAN OF CARE   OT POC based on physician orders, patient diagnosis and results of clinical assessment     Frequency/Duration:  1-2 x's a week for 12 sessions  Specific OT Treatment to include:      [x]? Instruction in HEP                   Modalities:  [x]?  Therapeutic Exercise                 [x]? Ultrasound               []? Electrical Stimulation/Attended  [x]? PROM/Stretching                    [x]? Fluidotherapy          [x]?   Paraffin                   []? AAROM  [x]?  AROM                 []? Iontophoresis:   [x]? Shaista Senna                                       []? Neuromuscular Re-Ed            []? ADL/IADL re-training    [x]?  Therapeutic Activity                  [x]? Pain Management with/without modalities PRN                 [x]?  Manual Therapy                      []? Splinting   [x]? Scar Management                   []?Joint Protection/Training  []? Ergonomics                             [x]?  Joint Mobilization                      []? Adaptive Equipment Assessment/Training                             [x]?  Manual Edema Mobilization   []? Myofascial Release                 []? Energy Conservation/Work Simplification  [x]? GM/FM Coordination                []? Safety retraining/education per  individual diagnosis/goals  [x]? Desensitization        Patient Specific Goal: To use her L hand again and get good motion.                              GOALS (Long term same as Short term):  1. ) Patient will demonstrate good understanding of home program (exercises/activities/diagnosis/prognosis/goals) with good accuracy. 2. ) Patient will demonstrate increased active/passive range of motion of their Left wrist and forearm to [facility] for ADL/IADL completion. 3. ) Patient will demonstrate increased /pinch strength of at least 10 / 5 pinch pounds of their Left hand. 4. ) Patient to report decreased pain in their affected Left  upper extremity from 5/10 to 2/10 or less with resistive functional use. 5. ) Patient to report 100% compliance with their splint wear, care, and precautions if needed. 6. ) Patient will be knowledgeable of edema control techniques as evident with decreases from slight  To none. 7. ) Patient will demonstrate a non-tender/non-adherent scar. 8. ) Patient will report ADL functions as Mod I/I using L UE .   9. ) Patient will decrease QuickDASH score to 30% or less for increased participation in daily functional activities.        Subjective: \"My doctor said I can start strengthening now\"       Objective:  Updated POC to be completed by 10th visit     INTERVENTION: COMPLETED: SPECIFICS/COMMENTS:   Modality:     Paraffin Bath  x 10 mins         AROM:     L wrist and forearm  X  X  X    x All planes   -Jux-a-cisor (6 reps)  -blue ball-

## 2021-06-24 ENCOUNTER — TREATMENT (OUTPATIENT)
Dept: OCCUPATIONAL THERAPY | Age: 76
End: 2021-06-24
Payer: MEDICARE

## 2021-06-24 DIAGNOSIS — S52.532A CLOSED COLLES' FRACTURE OF LEFT RADIUS, INITIAL ENCOUNTER: Primary | ICD-10-CM

## 2021-06-24 PROCEDURE — 97530 THERAPEUTIC ACTIVITIES: CPT | Performed by: OCCUPATIONAL THERAPIST

## 2021-06-24 PROCEDURE — 97110 THERAPEUTIC EXERCISES: CPT | Performed by: OCCUPATIONAL THERAPIST

## 2021-06-24 PROCEDURE — 97140 MANUAL THERAPY 1/> REGIONS: CPT | Performed by: OCCUPATIONAL THERAPIST

## 2021-06-24 PROCEDURE — 97018 PARAFFIN BATH THERAPY: CPT | Performed by: OCCUPATIONAL THERAPIST

## 2021-06-24 NOTE — PROGRESS NOTES
reps)  -blue ball- pronation/supination   -bean transfer with spoon to increase forearm supination/pronation  -Exercise spheres. AAROM:               PROM/Stretching:     L wrist and forearm  X  x All planes  -prayer stretches- 5 reps with 20 sec holds. Scar Mass/Edema Control:     Scar massage  x         Strengthening:     L hand strengthening  x Soft (beige theraputty) - - 20 reps and roll/pinch- 3 times, manipulation of items out of putty (10 pegs)    X  x -hand gripper setting 3-picking up 30 pom poms  -green resistive clip-medium resistance-picking up 30 pom poms for 3pt pinch strengthening   L wrist and forearm strengthening X      X  x -PRE's- 2# ^d from 1# wt 10 reps each flexion/extion, RD/UD  -UBE- 3 mins forward/3 mins backwards with BUE's (1 min each way with LUE)   -1# Wt'd dowel- 20 reps each- pro/supination, RD/UD. -6# Theraband bar (yellow)- 15 reps each- pronation, supination and twists   Other: HEP     Wrist AROM ex's x    Prayer stretches x      Assessment/Comments: Pt is making Fair + progress toward stated plan of care. Pt tolerated session well with an increase in strengthening activities this date. Pt progressing well with all activities. Will continue to advance as tolerated. -Rehab Potential: Good  -Requires OT Follow Up: Yes  Time In: 1400    Time Out: 1500             Visit #:6    Treatment Charges: Mins Units   Modalities paraffin 10 1   Ther Exercise 20 1   Manual Therapy 15 1   Thera Activities 10 1   ADL/Home Mgt      Neuro Re-education     Group Therapy     Non-Billable Service Time     Other     Total Time/Units 55 4       -Response to Treatment: Fair+ Pt. Is happy that her pain levels are minimal with exercise. Goals: Goals for pt can be see on initial eval occurring on  5/24/21    Plan:   [x]  Continues Plan of care: Focus on AROM and strengthening. Pt education continues at each visit to obtain maximum benefits from skilled OT intervention.   [] Alter Plan of care:   []  Discharge:       [facility], OTR/L #[id number]

## 2021-06-28 ENCOUNTER — TREATMENT (OUTPATIENT)
Dept: OCCUPATIONAL THERAPY | Age: 76
End: 2021-06-28
Payer: MEDICARE

## 2021-06-28 DIAGNOSIS — S52.532A CLOSED COLLES' FRACTURE OF LEFT RADIUS, INITIAL ENCOUNTER: Primary | ICD-10-CM

## 2021-06-28 PROCEDURE — 97018 PARAFFIN BATH THERAPY: CPT | Performed by: OCCUPATIONAL THERAPY ASSISTANT

## 2021-06-28 PROCEDURE — 97530 THERAPEUTIC ACTIVITIES: CPT | Performed by: OCCUPATIONAL THERAPY ASSISTANT

## 2021-06-28 PROCEDURE — 97110 THERAPEUTIC EXERCISES: CPT | Performed by: OCCUPATIONAL THERAPY ASSISTANT

## 2021-06-28 PROCEDURE — 97140 MANUAL THERAPY 1/> REGIONS: CPT | Performed by: OCCUPATIONAL THERAPY ASSISTANT

## 2021-06-28 NOTE — PROGRESS NOTES
OCCUPATIONAL THERAPY PROGRESS NOTE    Date:  2021  Initial Evaluation Date: 21     Patient Name:  Jose Antonio Collins    :  1945  Restrictions/Precautions: Follow ORIF DRF protocol , low fall risk  Diagnosis:  L DRF  ORIF   s52.502A                                                           Date of Surgery/Injury: sx 2021      Insurance/Certification information:  [employer] Medicare  Plan of care signed (Y/N): N  Visit# / total visits:     Pain Level: 1 on scale of 1-10, aching     Referring Practitioner:  Dr. Arlene Morrow  Specific Practitioner Orders: NWB, velcro brace - given by ,  ROM as tolerated, Initiate light strengthening today.      Assessment of current deficits   []? Functional mobility             [x]? ADLs          [x]? Strength                  []? Cognition   []? Functional transfers           []? IADLs         []? Safety Awareness  [x]? Endurance   []? Fine Motor Coordination    []? Balance      []? Vision/perception   []? Sensation     []? Gross Motor Coordination [x]? ROM           [x]? Pain                        [x]? Edema          [x]? Scar Adhesion/Skin Integrity      OT PLAN OF CARE   OT POC based on physician orders, patient diagnosis and results of clinical assessment     Frequency/Duration:  1-2 x's a week for 12 sessions  Specific OT Treatment to include:      [x]? Instruction in HEP                   Modalities:  [x]?  Therapeutic Exercise                 [x]? Ultrasound               []? Electrical Stimulation/Attended  [x]? PROM/Stretching                    [x]? Fluidotherapy          [x]?   Paraffin                   []? AAROM  [x]?  AROM                 []? Iontophoresis:   [x]? Mimi Munoz                                       []? Neuromuscular Re-Ed            []? ADL/IADL re-training    [x]?  Therapeutic Activity                  [x]? Pain Management with/without modalities PRN                 [x]?  Manual Therapy                      []? Splinting   [x]? Scar Management                   []?Joint Protection/Training  []? Ergonomics                             [x]?  Joint Mobilization                      []? Adaptive Equipment Assessment/Training                             [x]?  Manual Edema Mobilization   []? Myofascial Release                 []? Energy Conservation/Work Simplification  [x]? GM/FM Coordination                []? Safety retraining/education per  individual diagnosis/goals  [x]? Desensitization        Patient Specific Goal: To use her L hand again and get good motion.                              GOALS (Long term same as Short term):  1. ) Patient will demonstrate good understanding of home program (exercises/activities/diagnosis/prognosis/goals) with good accuracy. 2. ) Patient will demonstrate increased active/passive range of motion of their Left wrist and forearm to [facility] for ADL/IADL completion. 3. ) Patient will demonstrate increased /pinch strength of at least 10 / 5 pinch pounds of their Left hand. 4. ) Patient to report decreased pain in their affected Left  upper extremity from 5/10 to 2/10 or less with resistive functional use. 5. ) Patient to report 100% compliance with their splint wear, care, and precautions if needed. 6. ) Patient will be knowledgeable of edema control techniques as evident with decreases from slight  To none. 7. ) Patient will demonstrate a non-tender/non-adherent scar. 8. ) Patient will report ADL functions as Mod I/I using L UE .   9. ) Patient will decrease QuickDASH score to 30% or less for increased participation in daily functional activities.        Subjective: Pt states \"I cut my 's hair this weekend using my L hand\"      Objective:  Updated POC to be completed by 10th visit     INTERVENTION: COMPLETED: SPECIFICS/COMMENTS:   Modality:     Paraffin Bath  x 10 mins         AROM:     L wrist and forearm  X  X        x All planes   -Jux-a-cisor (6 reps)  -blue ball- pronation/supination   -bean transfer with spoon to increase forearm supination/pronation  -Exercise spheres.    -blanket folding ax. AAROM:               PROM/Stretching:     L wrist and forearm  X  x All planes  -prayer stretches- 5 reps with 20 sec holds. Scar Mass/Edema Control:     Scar massage  x         Strengthening:     L hand strengthening   Soft (beige theraputty) - - 20 reps and roll/pinch- 3 times, manipulation of items out of putty (10 pegs)      x -hand gripper setting 3-picking up 30 pom poms  -green resistive clip-medium resistance-picking up 30 pom poms for 3pt pinch strengthening   L wrist and forearm strengthening X  x    X  x -PRE's- 2# 10 reps each flexion/extion, RD/UD  -UBE- 3 mins forward/3 mins backwards with BUE's (1 min each way with LUE)   -1# Wt'd dowel- 20 reps each- pro/supination, RD/UD. -6# Theraband bar (yellow)- 15 reps each- pronation, supination and twists   Other: HEP     Wrist AROM ex's x    Prayer stretches x      Assessment/Comments: Pt is making Fair + progress toward stated plan of care. Pt. Tolerated all resistive exercises and stretches. Will continue to progress as tolerated. -Rehab Potential: Good  -Requires OT Follow Up: Yes  Time In: 1400    Time Out: 1500             Visit #:7    Treatment Charges: Mins Units   Modalities paraffin 10 1   Ther Exercise 20 1   Manual Therapy 15 1   Thera Activities 10 1   ADL/Home Mgt      Neuro Re-education     Group Therapy     Non-Billable Service Time     Other     Total Time/Units 55 4       -Response to Treatment: Fair+ Pt. Is happy that her pain levels are minimal with exercise. Goals: Goals for pt can be see on initial eval occurring on  5/24/21    Plan:   [x]  Continues Plan of care: Focus on AROM and strengthening. Pt education continues at each visit to obtain maximum benefits from skilled OT intervention.   []  Alter Plan of care:   []  Discharge:      Stephanie Davidson BENDER/L

## 2021-07-02 ENCOUNTER — TREATMENT (OUTPATIENT)
Dept: OCCUPATIONAL THERAPY | Age: 76
End: 2021-07-02
Payer: MEDICARE

## 2021-07-02 DIAGNOSIS — S52.532A CLOSED COLLES' FRACTURE OF LEFT RADIUS, INITIAL ENCOUNTER: Primary | ICD-10-CM

## 2021-07-02 PROCEDURE — 97530 THERAPEUTIC ACTIVITIES: CPT | Performed by: OCCUPATIONAL THERAPY ASSISTANT

## 2021-07-02 PROCEDURE — 97110 THERAPEUTIC EXERCISES: CPT | Performed by: OCCUPATIONAL THERAPY ASSISTANT

## 2021-07-02 PROCEDURE — 97140 MANUAL THERAPY 1/> REGIONS: CPT | Performed by: OCCUPATIONAL THERAPY ASSISTANT

## 2021-07-02 PROCEDURE — 97018 PARAFFIN BATH THERAPY: CPT | Performed by: OCCUPATIONAL THERAPY ASSISTANT

## 2021-07-02 NOTE — PROGRESS NOTES
OCCUPATIONAL THERAPY PROGRESS NOTE    Date:  2021  Initial Evaluation Date: 21     Patient Name:  Eric Dhaliwal    :  1945  Restrictions/Precautions: Follow ORIF DRF protocol , low fall risk  Diagnosis:  L DRF  ORIF   s52.502A                                                           Date of Surgery/Injury: sx 2021      Insurance/Certification information:  [employer] Medicare  Plan of care signed (Y/N): N  Visit# / total visits:     Pain Level: 1 on scale of 1-10, aching     Referring Practitioner:  Dr. Kassie Alexis  Specific Practitioner Orders: NWB, velcro brace - given by ,  ROM as tolerated, Initiate light strengthening today.      Assessment of current deficits   []? Functional mobility             [x]? ADLs          [x]? Strength                  []? Cognition   []? Functional transfers           []? IADLs         []? Safety Awareness  [x]? Endurance   []? Fine Motor Coordination    []? Balance      []? Vision/perception   []? Sensation     []? Gross Motor Coordination [x]? ROM           [x]? Pain                        [x]? Edema          [x]? Scar Adhesion/Skin Integrity      OT PLAN OF CARE   OT POC based on physician orders, patient diagnosis and results of clinical assessment     Frequency/Duration:  1-2 x's a week for 12 sessions  Specific OT Treatment to include:      [x]? Instruction in HEP                   Modalities:  [x]?  Therapeutic Exercise                 [x]? Ultrasound               []? Electrical Stimulation/Attended  [x]? PROM/Stretching                    [x]? Fluidotherapy          [x]?   Paraffin                   []? AAROM  [x]?  AROM                 []? Iontophoresis:   [x]? Catia Aponte                                       []? Neuromuscular Re-Ed            []? ADL/IADL re-training    [x]?  Therapeutic Activity                  [x]? Pain Management with/without modalities PRN                 [x]?  Manual Therapy                      []? Splinting   [x]? Scar Management                   []?Joint Protection/Training  []? Ergonomics                             [x]?  Joint Mobilization                      []? Adaptive Equipment Assessment/Training                             [x]?  Manual Edema Mobilization   []? Myofascial Release                 []? Energy Conservation/Work Simplification  [x]? GM/FM Coordination                []? Safety retraining/education per  individual diagnosis/goals  [x]? Desensitization        Patient Specific Goal: To use her L hand again and get good motion.                              GOALS (Long term same as Short term):  1. ) Patient will demonstrate good understanding of home program (exercises/activities/diagnosis/prognosis/goals) with good accuracy. 2. ) Patient will demonstrate increased active/passive range of motion of their Left wrist and forearm to [facility] for ADL/IADL completion. 3. ) Patient will demonstrate increased /pinch strength of at least 10 / 5 pinch pounds of their Left hand. 4. ) Patient to report decreased pain in their affected Left  upper extremity from 5/10 to 2/10 or less with resistive functional use. 5. ) Patient to report 100% compliance with their splint wear, care, and precautions if needed. 6. ) Patient will be knowledgeable of edema control techniques as evident with decreases from slight  To none. 7. ) Patient will demonstrate a non-tender/non-adherent scar. 8. ) Patient will report ADL functions as Mod I/I using L UE .   9. ) Patient will decrease QuickDASH score to 30% or less for increased participation in daily functional activities.        Subjective: Pt states \"I trimmed a tree and carried the branches with my L hand\"      Objective:  Updated POC to be completed by 10th visit     INTERVENTION: COMPLETED: SPECIFICS/COMMENTS:   Modality:     Paraffin Bath  x 10 mins         AROM:     L wrist and forearm  X  X        x All planes   -Jux-a-cisor (6 reps)  -blue ball- pronation/supination   -bean transfer with spoon to increase forearm supination/pronation  -Exercise spheres.    -blanket folding ax. AAROM:               PROM/Stretching:     L wrist and forearm  X  x All planes  -prayer stretches- 5 reps with 20 sec holds. Scar Mass/Edema Control:     Scar massage  x         Strengthening:     L hand strengthening   Soft (beige theraputty) - - 20 reps and roll/pinch- 3 times, manipulation of items out of putty (10 pegs)    X   -hand gripper setting 3- Dynamic-25 reps Static- 10 reps with 10 count.   -green resistive clip-medium resistance-picking up 30 pom poms for 3pt pinch strengthening   L wrist and forearm strengthening X  x    X  x -PRE's- 2# 10 reps each flexion/extion, RD/UD  -UBE- 3 mins forward/3 mins backwards with BUE's (1 min each way with LUE)   -1# Wt'd dowel- 20 reps each- pro/supination, RD/UD. -6# Theraband bar (yellow)- 15 reps each- pronation, supination and twists   Other: HEP     Wrist AROM ex's x    Prayer stretches x      Assessment/Comments: Pt is making Fair + progress toward stated plan of care. Pt. Tolerated all resistive exercises and stretches. Will re-evaluate next session Will continue to progress as tolerated. -Rehab Potential: Good  -Requires OT Follow Up: Yes  Time In: 1400    Time Out: 1500             Visit #:8    Treatment Charges: Mins Units   Modalities paraffin 10 1   Ther Exercise 20 1   Manual Therapy 15 1   Thera Activities 10 1   ADL/Home Mgt      Neuro Re-education     Group Therapy     Non-Billable Service Time     Other     Total Time/Units 55 4       -Response to Treatment: Fair+ Pt. Is happy that her pain levels are minimal with exercise. Goals: Goals for pt can be see on initial eval occurring on  5/24/21    Plan:   [x]  Continues Plan of care: Focus on AROM and strengthening. Pt education continues at each visit to obtain maximum benefits from skilled OT intervention.   []  Alter Plan of care:   []  Discharge:      Sherlyn BENDER/ESTELA [id number]

## 2021-07-07 ENCOUNTER — TREATMENT (OUTPATIENT)
Dept: OCCUPATIONAL THERAPY | Age: 76
End: 2021-07-07
Payer: MEDICARE

## 2021-07-07 DIAGNOSIS — S52.532A CLOSED COLLES' FRACTURE OF LEFT RADIUS, INITIAL ENCOUNTER: Primary | ICD-10-CM

## 2021-07-07 PROCEDURE — 97140 MANUAL THERAPY 1/> REGIONS: CPT | Performed by: OCCUPATIONAL THERAPIST

## 2021-07-07 PROCEDURE — 97110 THERAPEUTIC EXERCISES: CPT | Performed by: OCCUPATIONAL THERAPIST

## 2021-07-07 PROCEDURE — 97530 THERAPEUTIC ACTIVITIES: CPT | Performed by: OCCUPATIONAL THERAPIST

## 2021-07-07 PROCEDURE — 97018 PARAFFIN BATH THERAPY: CPT | Performed by: OCCUPATIONAL THERAPIST

## 2021-07-07 NOTE — PROGRESS NOTES
OCCUPATIONAL THERAPY PROGRESS NOTE/REASSESSMENT    Date:  2021  Initial Evaluation Date: 21     Patient Name:  Tashia Piña    :  1945  Restrictions/Precautions: Follow ORIF DRF protocol , low fall risk  Diagnosis:  L DRF  ORIF   s52.502A                                                           Date of Surgery/Injury: sx 2021      Insurance/Certification information:  tna Medicare  Plan of care signed (Y/N): N  Visit# / total visits:     Pain Level: 1 on scale of 1-10, aching     Referring Practitioner:  Dr. Cely Anand  Specific Practitioner Orders: NWB, velcro brace - given by ,  ROM as tolerated, Initiate light strengthening today.      Assessment of current deficits   []? Functional mobility             [x]? ADLs          [x]? Strength                  []? Cognition   []? Functional transfers           []? IADLs         []? Safety Awareness  [x]? Endurance   []? Fine Motor Coordination    []? Balance      []? Vision/perception   []? Sensation     []? Gross Motor Coordination [x]? ROM           [x]? Pain                        [x]? Edema          [x]? Scar Adhesion/Skin Integrity      OT PLAN OF CARE   OT POC based on physician orders, patient diagnosis and results of clinical assessment     Frequency/Duration:  1-2 x's a week for 12 sessions  Specific OT Treatment to include:      [x]? Instruction in HEP                   Modalities:  [x]?  Therapeutic Exercise                 [x]? Ultrasound               []? Electrical Stimulation/Attended  [x]? PROM/Stretching                    [x]? Fluidotherapy          [x]?   Paraffin                   []? AAROM  [x]?  AROM                 []? Iontophoresis:   [x]? Kelsey Bi                                       []? Neuromuscular Re-Ed            []? ADL/IADL re-training    [x]?  Therapeutic Activity                  [x]? Pain Management with/without modalities PRN                 [x]? Nida Nations []? Splinting                                   [x]? Scar Management                   []?Joint Protection/Training  []? Ergonomics                             [x]?  Joint Mobilization                      []? Adaptive Equipment Assessment/Training                             [x]?  Manual Edema Mobilization   []? Myofascial Release                 []? Energy Conservation/Work Simplification  [x]? GM/FM Coordination                []? Safety retraining/education per  individual diagnosis/goals  [x]? Desensitization        Patient Specific Goal: To use her L hand again and get good motion.                              GOALS (Long term same as Short term):  1. ) Patient will demonstrate good understanding of home program (exercises/activities/diagnosis/prognosis/goals) with good accuracy. Goal met and continues to follow instructions with new HEP  2. ) Patient will demonstrate increased active/passive range of motion of their Left wrist and forearm to [facility] for ADL/IADL completion. Goal progressing-still is unable to    3. ) Patient will demonstrate increased /pinch strength of at least 10 / 5 pinch pounds of their Left hand. Goal progressing, first assessed this date. 4. ) Patient to report decreased pain in their affected Left  upper extremity from 5/10 to 2/10 or less with resistive functional use. Goal met. 5. ) Patient to report 100% compliance with their splint wear, care, and precautions if needed. Goal met. 6. ) Patient will be knowledgeable of edema control techniques as evident with decreases from slight  To none. Goal met. 7. ) Patient will demonstrate a non-tender/non-adherent scar. Goal met. 8. ) Patient will report ADL functions as Mod I/I using L UE . Goal met. 9. ) Patient will decrease QuickDASH score to 30% or less for increased participation in daily functional activities. Goal met-rated 16% this date.        Subjective: Pt states \"I have no pain, but I still cant trim the big bushes\"      Objective:  Updated POC to be completed by 10th visit     INTERVENTION: COMPLETED: SPECIFICS/COMMENTS:   Modality:     Paraffin Bath  x 10 mins         AROM:     L wrist and forearm  X  X        x All planes   -Jux-a-cisor (6 reps)  -blue ball- pronation/supination   -bean transfer with spoon to increase forearm supination/pronation  -Exercise spheres.    -blanket folding ax. AAROM:               PROM/Stretching:     L wrist and forearm  X  x All planes  -prayer stretches- 5 reps with 20 sec holds. Scar Mass/Edema Control:     Scar massage  x         Strengthening:     L hand strengthening  x Light (yellow theraputty) - - 20 reps and roll/pinch- 3 times, manipulation of items out of putty (10 pegs)-^d from soft putty    X   -hand gripper setting 3- Dynamic-25 reps Static- 10 reps with 10 count.   -green resistive clip-medium resistance-picking up 30 pom poms for 3pt pinch strengthening   L wrist and forearm strengthening X  x    X  x -PRE's- 2# 10 reps each flexion/extion, RD/UD  -UBE- 3 mins forward/3 mins backwards with BUE's (1 min each way with LUE)   -3# Wt'd dowel ^d from 1#- 20 reps each- pro/supination, RD/UD.   -10# Theraband bar (red)- 15 reps each- pronation, supination and twists   Other: HEP     Wrist AROM ex's x    Prayer stretches x      Assessment/Comments: Pt is making Fair + progress toward stated plan of care. Pt tolerated session well-continues to show good progress toward states goals. ROM and strengthening continues to increase. Will continue to progress as tolerated. Left  Upper Extremity  AROM     FOREARM Pronation 80-90* 62* R 70*  WNL         Supination 80-90* WNL  WNL          WRIST Flexion 0-70* 40*  68*         Extension 0-80* 33*  55*         Radial Deviation 0-20* 18*  20*         Ulnar Deviation 0-30* 28*  25*          Edema Description/Circumferential Measurements:              Pt has slight edema in her L wrist.    Circum. Mease  Wrist   R - 14.4 cm                                          L  - 15 cm      Dynamometer (setting 2):                              Left: NT,  20#                                                Right: NT,  35#                            Pinch Meter:              Lateral: Left= NT,  7#  Right= NT ,  10#              Palmar 3 point: Left= NT,   7#  Right= NT,  10#     9 Hole Peg Test:              Left: :29,  21 seconds              Right:  :26,  20 seconds     QuickDASH Score: 52% disability,  16% disability   -Rehab Potential: Good  -Requires OT Follow Up: Yes  Time In: 1400    Time Out: 1500             Visit #:9    Treatment Charges: Mins Units   Modalities paraffin 10 1   Ther Exercise 20 1   Manual Therapy 15 1   Thera Activities 10 1   ADL/Home Mgt      Neuro Re-education     Group Therapy     Non-Billable Service Time     Other     Total Time/Units 55 4       -Response to Treatment: Fair+ Pt. Is happy that her pain levels are minimal with exercise. Goals: Goals for pt can be see on initial eval occurring on  5/24/21    Plan:   [x]  Continues Plan of care: Focus on AROM and strengthening. Pt education continues at each visit to obtain maximum benefits from skilled OT intervention. []  Alter Plan of care:   []  Discharge:       [address] [facility], OTR/L #[id number]

## 2021-07-09 ENCOUNTER — TREATMENT (OUTPATIENT)
Dept: OCCUPATIONAL THERAPY | Age: 76
End: 2021-07-09
Payer: MEDICARE

## 2021-07-09 DIAGNOSIS — S52.532A CLOSED COLLES' FRACTURE OF LEFT RADIUS, INITIAL ENCOUNTER: Primary | ICD-10-CM

## 2021-07-09 PROCEDURE — 97140 MANUAL THERAPY 1/> REGIONS: CPT | Performed by: OCCUPATIONAL THERAPY ASSISTANT

## 2021-07-09 PROCEDURE — 97110 THERAPEUTIC EXERCISES: CPT | Performed by: OCCUPATIONAL THERAPY ASSISTANT

## 2021-07-09 PROCEDURE — 97530 THERAPEUTIC ACTIVITIES: CPT | Performed by: OCCUPATIONAL THERAPY ASSISTANT

## 2021-07-09 NOTE — PROGRESS NOTES
OCCUPATIONAL THERAPY PROGRESS NOTE    Date:  2021  Initial Evaluation Date: 21     Patient Name:  Francis Shen    :  1945  Restrictions/Precautions: Follow ORIF DRF protocol , low fall risk  Diagnosis:  L DRF  ORIF   s52.502A                                                           Date of Surgery/Injury: sx 2021      Insurance/Certification information:  [employer] Medicare  Plan of care signed (Y/N): N  Visit# / total visits: 10/ 12    Pain Level: 1 on scale of 1-10, aching     Referring Practitioner:  Dr. Dionne Williamson  Specific Practitioner Orders: NWB, velcro brace - given by ,  ROM as tolerated, Initiate light strengthening today.      Assessment of current deficits   []? Functional mobility             [x]? ADLs          [x]? Strength                  []? Cognition   []? Functional transfers           []? IADLs         []? Safety Awareness  [x]? Endurance   []? Fine Motor Coordination    []? Balance      []? Vision/perception   []? Sensation     []? Gross Motor Coordination [x]? ROM           [x]? Pain                        [x]? Edema          [x]? Scar Adhesion/Skin Integrity      OT PLAN OF CARE   OT POC based on physician orders, patient diagnosis and results of clinical assessment     Frequency/Duration:  1-2 x's a week for 12 sessions  Specific OT Treatment to include:      [x]? Instruction in HEP                   Modalities:  [x]?  Therapeutic Exercise                 [x]? Ultrasound               []? Electrical Stimulation/Attended  [x]? PROM/Stretching                    [x]? Fluidotherapy          [x]?   Paraffin                   []? AAROM  [x]?  AROM                 []? Iontophoresis:   [x]? Garfield Both                                       []? Neuromuscular Re-Ed            []? ADL/IADL re-training    [x]?  Therapeutic Activity                  [x]? Pain Management with/without modalities PRN                 [x]?  Manual Therapy                      []? Splinting   first time\"      Objective:  Updated POC to be completed by 10th visit     INTERVENTION: COMPLETED: SPECIFICS/COMMENTS:   Modality:     Paraffin Bath   10 mins    MH x 5 mins    AROM:     L wrist and forearm  X           All planes   -Jux-a-cisor (6 reps)  -blue ball- pronation/supination   -bean transfer with spoon to increase forearm supination/pronation  -Exercise spheres.    -blanket folding ax. AAROM:               PROM/Stretching:     L wrist and forearm  X  x All planes  -prayer stretches- 5 reps with 20 sec holds. Scar Mass/Edema Control:     Scar massage  x         Strengthening:     L hand strengthening   Light (yellow theraputty) - - 20 reps and roll/pinch- 3 times, manipulation of items out of putty (10 pegs)-^d from soft putty    X      x -hand gripper setting 3- Dynamic-25 reps Static- 10 reps with 10 count.   -green resistive clip-medium resistance-picking up 30 pom poms for 3pt pinch strengthening  -2# Medicine ball lift an place- 20 reps    L wrist and forearm strengthening X  x    X    X  x -PRE's- 3# 10 reps each flexion/extion, RD/UD  -UBE- 3 mins forward/3 mins backwards with BUE's (1 min each way with LUE)   -Cable machine- Low rows with 10# wt. (overhand/underhand)- 10 reps Bicep curls- 5# wt- 10 reps   -2# Wt'd dowel ^d from 1#- 20 reps each- pro/supination, RD/UD.   -10# Theraband bar (red)- 15 reps each- pronation, supination and twists   Other: HEP     Wrist AROM ex's x    Prayer stretches x      Assessment/Comments: Pt is making Fair + progress toward stated plan of care. Pt. Tolerated all new resistive exercises today. Focus of therapy for the next 2-3 weeks will be on strengthening so patient can perform all the tasks around the house and help take care of her  who has Parkinson's.       Left  Upper Extremity  AROM     FOREARM Pronation 80-90* 62* R 70*  WNL         Supination 80-90* WNL  WNL          WRIST Flexion 0-70* 40*  68*         Extension 0-80* 33*  55*         Radial Deviation 0-20* 18*  20*         Ulnar Deviation 0-30* 28*  25*          Edema Description/Circumferential Measurements:              Pt has slight edema in her L wrist.    Circum. Mease  Wrist   R - 14.4 cm                                          L  - 15 cm      Dynamometer (setting 2):                              Left: NT,  20#                                                Right: NT,  35#                            Pinch Meter:              Lateral: Left= NT,  7#  Right= NT ,  10#              Palmar 3 point: Left= NT,   7#  Right= NT,  10#     9 Hole Peg Test:              Left: :29,  21 seconds              Right:  :26,  20 seconds     QuickDASH Score: 52% disability,  16% disability   -Rehab Potential: Good  -Requires OT Follow Up: Yes  Time In: 1300    Time Out: 1400             Visit #:10    Treatment Charges: Mins Units   Modalities paraffin     Ther Exercise 30 2   Manual Therapy 15 1   Thera Activities 10 1   ADL/Home Mgt      Neuro Re-education     Group Therapy     Non-Billable Service Time 5 0   Other     Total Time/Units 60 4       -Response to Treatment: Fair+ Pt. Is happy that her pain levels are minimal with exercise. Goals: Goals for pt can be see on initial eval occurring on  5/24/21    Plan:   [x]  Continues Plan of care: Continue therapy for 2 x a week for 2-3 more weeks to focus on strengthening. Pt education continues at each visit to obtain maximum benefits from skilled OT intervention.   []  [facility] of care:   []  Discharge:      Irasema BENDER/ESTELA [id number]

## 2021-07-12 ENCOUNTER — TREATMENT (OUTPATIENT)
Dept: OCCUPATIONAL THERAPY | Age: 76
End: 2021-07-12
Payer: MEDICARE

## 2021-07-12 DIAGNOSIS — S52.532A CLOSED COLLES' FRACTURE OF LEFT RADIUS, INITIAL ENCOUNTER: Primary | ICD-10-CM

## 2021-07-12 PROCEDURE — 97018 PARAFFIN BATH THERAPY: CPT | Performed by: OCCUPATIONAL THERAPY ASSISTANT

## 2021-07-12 PROCEDURE — 97110 THERAPEUTIC EXERCISES: CPT | Performed by: OCCUPATIONAL THERAPY ASSISTANT

## 2021-07-12 PROCEDURE — 97140 MANUAL THERAPY 1/> REGIONS: CPT | Performed by: OCCUPATIONAL THERAPY ASSISTANT

## 2021-07-12 NOTE — PROGRESS NOTES
OCCUPATIONAL THERAPY PROGRESS NOTE    Date:  2021  Initial Evaluation Date: 21     Patient Name:  Manpreet Cagle    :  1945  Restrictions/Precautions: Follow ORIF DRF protocol , low fall risk  Diagnosis:  L DRF  ORIF   s52.502A                                                           Date of Surgery/Injury: sx 2021      Insurance/Certification information:  [employer] Medicare  Plan of care signed (Y/N): N  Visit# / total visits:     Pain Level: 0-1 on scale of 1-10, aching     Referring Practitioner:  Dr. Martinez [facility]  Specific Practitioner Orders: NWB, velcro brace - given by ,  ROM as tolerated, Initiate light strengthening today.      Assessment of current deficits   []? Functional mobility             [x]? ADLs          [x]? Strength                  []? Cognition   []? Functional transfers           []? IADLs         []? Safety Awareness  [x]? Endurance   []? Fine Motor Coordination    []? Balance      []? Vision/perception   []? Sensation     []? Gross Motor Coordination [x]? ROM           [x]? Pain                        [x]? Edema          [x]? Scar Adhesion/Skin Integrity      OT PLAN OF CARE   OT POC based on physician orders, patient diagnosis and results of clinical assessment     Frequency/Duration:  1-2 x's a week for 12 sessions  Specific OT Treatment to include:      [x]? Instruction in HEP                   Modalities:  [x]?  Therapeutic Exercise                 [x]? Ultrasound               []? Electrical Stimulation/Attended  [x]? PROM/Stretching                    [x]? Fluidotherapy          [x]?   Paraffin                   []? AAROM  [x]?  AROM                 []? Iontophoresis:   [x]? Glyn Severs                                       []? Neuromuscular Re-Ed            []? ADL/IADL re-training    [x]?  Therapeutic Activity                  [x]? Pain Management with/without modalities PRN                 [x]? Carie Suresh []? Splinting                                   [x]? Scar Management                   []?Joint Protection/Training  []? Ergonomics                             [x]?  Joint Mobilization                      []? Adaptive Equipment Assessment/Training                             [x]?  Manual Edema Mobilization   []? Myofascial Release                 []? Energy Conservation/Work Simplification  [x]? GM/FM Coordination                []? Safety retraining/education per  individual diagnosis/goals  [x]? Desensitization        Patient Specific Goal: To use her L hand again and get good motion.                              GOALS (Long term same as Short term):  1. ) Patient will demonstrate good understanding of home program (exercises/activities/diagnosis/prognosis/goals) with good accuracy. Goal met and continues to follow instructions with new HEP  2. ) Patient will demonstrate increased active/passive range of motion of their Left wrist and forearm to [facility] for ADL/IADL completion. Goal progressing-still is unable to    3. ) Patient will demonstrate increased /pinch strength of at least 10 / 5 pinch pounds of their Left hand. Goal progressing, first assessed this date. 4. ) Patient to report decreased pain in their affected Left  upper extremity from 5/10 to 2/10 or less with resistive functional use. Goal met. 5. ) Patient to report 100% compliance with their splint wear, care, and precautions if needed. Goal met. 6. ) Patient will be knowledgeable of edema control techniques as evident with decreases from slight  To none. Goal met. 7. ) Patient will demonstrate a non-tender/non-adherent scar. Goal met. 8. ) Patient will report ADL functions as Mod I/I using L UE . Goal met. 9. ) Patient will decrease QuickDASH score to 30% or less for increased participation in daily functional activities. Goal met-rated 16% this date.        Subjective: Pt stated \"I felt pretty good after last session, but my bicep was sore from the new exercises. \"      Objective:  Updated POC to be completed by 10th visit     INTERVENTION: COMPLETED: SPECIFICS/COMMENTS:   Modality:     Paraffin Bath  x 10 mins    MH  5 mins    AROM:     L wrist and forearm  X           All planes   -Jux-a-cisor (6 reps)  -blue ball- pronation/supination   -bean transfer with spoon to increase forearm supination/pronation  -Exercise spheres.    -blanket folding ax. AAROM:               PROM/Stretching:     L wrist and forearm  X  x All planes  -prayer stretches- 5 reps with 20 sec holds. Scar Mass/Edema Control:     Scar massage  x         Strengthening:     L hand strengthening   Light (yellow theraputty) - - 20 reps and roll/pinch- 3 times, manipulation of items out of putty (10 pegs)-^d from soft putty    X      x -hand gripper setting 3- Dynamic-25 reps Static- 10 reps with 10 count.   -green resistive clip-medium resistance-picking up 30 pom poms for 3pt pinch strengthening  -2.6# Medicine ball lift an place- 20 reps    L wrist and forearm strengthening X      X    X  X  X  x -PRE's- 3# 10 reps each flexion/extion, RD/UD  -UBE- 3 mins forward/3 mins backwards with BUE's (1 min each way with LUE)   -Cable machine-High, middle and low rows with 10# wt.- 10 reps Bicep curls- 5# wt- 10 reps   -2# Wt'd dowel ^d from 1#- 20 reps each- pro/supination, RD/UD.   -10# Theraband bar (red)- 15 reps each- pronation, supination and twists  -wt'd cart -6 times around clinic.  -23# wt'd box lift and place- 10 reps    Other: HEP     Wrist AROM ex's x    Prayer stretches x      Assessment/Comments: Pt is making Fair + progress toward stated plan of care. Pt. Continues to tolerate all new and existing resistive ex's.  Will continue to advance as tolerated to be able to perform all tasks at home.        -Rehab Potential: Good  -Requires OT Follow Up: Yes  Time In: 1305  Time Out: 1400             Visit #:11    Treatment Charges: Mins Units   Modalities paraffin 10 1   Ther Exercise 30 2   Manual Therapy 15 1   Thera Activities     ADL/Home Mgt      Neuro Re-education     Group Therapy     Non-Billable Service Time     Other     Total Time/Units 55 4       -Response to Treatment: Fair+ Pt. Is happy that her pain levels are minimal with exercise. Goals: Goals for pt can be see on initial eval occurring on  5/24/21    Plan:   [x]  Continues Plan of care: Focus on strengthening and AROM of LUE. Pt education continues at each visit to obtain maximum benefits from skilled OT intervention.   []  [facility] of care:   []  Discharge:      Raymond BENDER/ESTELA [id number]

## 2021-07-16 ENCOUNTER — TREATMENT (OUTPATIENT)
Dept: OCCUPATIONAL THERAPY | Age: 76
End: 2021-07-16
Payer: MEDICARE

## 2021-07-16 DIAGNOSIS — S52.532A CLOSED COLLES' FRACTURE OF LEFT RADIUS, INITIAL ENCOUNTER: Primary | ICD-10-CM

## 2021-07-16 PROCEDURE — 97140 MANUAL THERAPY 1/> REGIONS: CPT | Performed by: OCCUPATIONAL THERAPY ASSISTANT

## 2021-07-16 PROCEDURE — 97110 THERAPEUTIC EXERCISES: CPT | Performed by: OCCUPATIONAL THERAPY ASSISTANT

## 2021-07-16 PROCEDURE — 97018 PARAFFIN BATH THERAPY: CPT | Performed by: OCCUPATIONAL THERAPY ASSISTANT

## 2021-07-16 NOTE — PROGRESS NOTES
OCCUPATIONAL THERAPY PROGRESS NOTE    Date:  2021  Initial Evaluation Date: 21     Patient Name:  Mer Earl    :  1945  Restrictions/Precautions: Follow ORIF DRF protocol , low fall risk  Diagnosis:  L DRF  ORIF   s52.502A                                                           Date of Surgery/Injury: sx 2021      Insurance/Certification information:  [employer]na Medicare  Plan of care signed (Y/N): Y thru 21  Visit# / total visits:     Pain Level: 0-1 on scale of 1-10, aching     Referring Practitioner:  Dr. Josh Guy  Specific Practitioner Orders: NWB, velcro brace - given by ,  ROM as tolerated, Initiate light strengthening today.      Assessment of current deficits   []? Functional mobility             [x]? ADLs          [x]? Strength                  []? Cognition   []? Functional transfers           []? IADLs         []? Safety Awareness  [x]? Endurance   []? Fine Motor Coordination    []? Balance      []? Vision/perception   []? Sensation     []? Gross Motor Coordination [x]? ROM           [x]? Pain                        [x]? Edema          [x]? Scar Adhesion/Skin Integrity      OT PLAN OF CARE   OT POC based on physician orders, patient diagnosis and results of clinical assessment     Frequency/Duration:  1-2 x's a week for 12 sessions  Specific OT Treatment to include:      [x]? Instruction in HEP                   Modalities:  [x]?  Therapeutic Exercise                 [x]? Ultrasound               []? Electrical Stimulation/Attended  [x]? PROM/Stretching                    [x]? Fluidotherapy          [x]?   Paraffin                   []? AAROM  [x]?  AROM                 []? Iontophoresis:   [x]? Colleen Naqvi                                       []? Neuromuscular Re-Ed            []? ADL/IADL re-training    [x]?  Therapeutic Activity                  [x]? Pain Management with/without modalities PRN                 [x]? Zuri Aceves []? Splinting                                   [x]? Scar Management                   []?Joint Protection/Training  []? Ergonomics                             [x]?  Joint Mobilization                      []? Adaptive Equipment Assessment/Training                             [x]?  Manual Edema Mobilization   []? Myofascial Release                 []? Energy Conservation/Work Simplification  [x]? GM/FM Coordination                []? Safety retraining/education per  individual diagnosis/goals  [x]? Desensitization        Patient Specific Goal: To use her L hand again and get good motion.                              GOALS (Long term same as Short term):  1. ) Patient will demonstrate good understanding of home program (exercises/activities/diagnosis/prognosis/goals) with good accuracy. Goal met and continues to follow instructions with new HEP  2. ) Patient will demonstrate increased active/passive range of motion of their Left wrist and forearm to [facility] for ADL/IADL completion. Goal progressing-still is unable to    3. ) Patient will demonstrate increased /pinch strength of at least 10 / 5 pinch pounds of their Left hand. Goal progressing, first assessed this date. 4. ) Patient to report decreased pain in their affected Left  upper extremity from 5/10 to 2/10 or less with resistive functional use. Goal met. 5. ) Patient to report 100% compliance with their splint wear, care, and precautions if needed. Goal met. 6. ) Patient will be knowledgeable of edema control techniques as evident with decreases from slight  To none. Goal met. 7. ) Patient will demonstrate a non-tender/non-adherent scar. Goal met. 8. ) Patient will report ADL functions as Mod I/I using L UE . Goal met. 9. ) Patient will decrease QuickDASH score to 30% or less for increased participation in daily functional activities. Goal met-rated 16% this date. Subjective: Pt stated \"I trimmed the bushes.  I had to quit because I was tired, but I didn't have any pain. \"     Objective:  Updated POC to be completed by 10th visit     INTERVENTION: COMPLETED: SPECIFICS/COMMENTS:   Modality:     Paraffin Bath  x 10 mins    MH  5 mins    AROM:     L wrist and forearm  X           All planes   -Jux-a-cisor (6 reps)  -blue ball- pronation/supination   -bean transfer with spoon to increase forearm supination/pronation  -Exercise spheres.    -blanket folding ax. AAROM:               PROM/Stretching:     L wrist and forearm  X  x All planes  -prayer stretches- 5 reps with 20 sec holds. Scar Mass/Edema Control:     Scar massage  x         Strengthening:     L hand strengthening   Light (yellow theraputty) - - 20 reps and roll/pinch- 3 times, manipulation of items out of putty (10 pegs)-^d from soft putty     -hand gripper setting 3- Dynamic-25 reps Static- 10 reps with 10 count.   -green resistive clip-medium resistance-picking up 30 pom poms for 3pt pinch strengthening  -2.6# Medicine ball lift an place- 20 reps    L wrist and forearm strengthening X  x    X    X  X  X  X  x -PRE's- 3# 15 reps each flexion/extion, RD/UD  -UBE- 3 mins forward/3 mins backwards with BUE's (1 min each way with LUE)   -Cable machine-High, middle and low rows with 10# wt.- 10 reps Bicep curls- 5# wt- 10 reps   -2# Wt'd dowel ^d from 1#- 20 reps each- pro/supination, RD/UD.   -10# Theraband bar (red)- 15 reps each- pronation, supination and twists  -wt'd cart -6 times around clinic.  -23# wt'd box lift and place- 10 reps   -Mr. Genia Whitfield with 2# wt. 4 reps    Other: HEP     Wrist AROM ex's x    Prayer stretches x      Assessment/Comments: Pt is making Fair + progress toward stated plan of care. Pt. Tolerated all new and existing resistive exercises to increase independence with taking care of  and household needs.  Will continue to advance as tolerated.        -Rehab Potential: Good  -Requires OT Follow Up: Yes  Time In: 1300  Time Out: 1400             Visit #:12    Treatment Charges: Mins Units   Modalities paraffin 10 1   Ther Exercise 35 2   Manual Therapy 15 1   Thera Activities     ADL/Home Mgt      Neuro Re-education     Group Therapy     Non-Billable Service Time     Other     Total Time/Units 60 4       -Response to Treatment: Fair+ Pt. Is happy that her pain levels are minimal with exercise. Goals: Goals for pt can be see on initial eval occurring on  5/24/21    Plan:   [x]  Continues Plan of care: Focus on strengthening and AROM of LUE. Pt education continues at each visit to obtain maximum benefits from skilled OT intervention.   []  [address] of care:   []  Discharge:      Nida BENDER/ESTELA [id number]

## 2021-07-19 ENCOUNTER — TREATMENT (OUTPATIENT)
Dept: OCCUPATIONAL THERAPY | Age: 76
End: 2021-07-19
Payer: MEDICARE

## 2021-07-19 DIAGNOSIS — S52.532A CLOSED COLLES' FRACTURE OF LEFT RADIUS, INITIAL ENCOUNTER: Primary | ICD-10-CM

## 2021-07-19 PROCEDURE — 97110 THERAPEUTIC EXERCISES: CPT | Performed by: OCCUPATIONAL THERAPY ASSISTANT

## 2021-07-19 PROCEDURE — 97018 PARAFFIN BATH THERAPY: CPT | Performed by: OCCUPATIONAL THERAPY ASSISTANT

## 2021-07-19 PROCEDURE — 97140 MANUAL THERAPY 1/> REGIONS: CPT | Performed by: OCCUPATIONAL THERAPY ASSISTANT

## 2021-07-19 NOTE — PROGRESS NOTES
OCCUPATIONAL THERAPY PROGRESS NOTE    Date:  2021  Initial Evaluation Date: 21     Patient Name:  Fredo Dowd    :  1945  Restrictions/Precautions: Follow ORIF DRF protocol , low fall risk  Diagnosis:  L DRF  ORIF   s52.502A                                                           Date of Surgery/Injury: sx 2021      Insurance/Certification information:  [employer] Medicare  Plan of care signed (Y/N): Y thru 21  Visit# / total visits:     Pain Level: 0-1 on scale of 1-10, aching     Referring Practitioner:  Dr. [place] Sharpe  Specific Practitioner Orders: NWB, velcro brace - given by ,  ROM as tolerated, Initiate light strengthening today.      Assessment of current deficits   []? Functional mobility             [x]? ADLs          [x]? Strength                  []? Cognition   []? Functional transfers           []? IADLs         []? Safety Awareness  [x]? Endurance   []? Fine Motor Coordination    []? Balance      []? Vision/perception   []? Sensation     []? Gross Motor Coordination [x]? ROM           [x]? Pain                        [x]? Edema          [x]? Scar Adhesion/Skin Integrity      OT PLAN OF CARE   OT POC based on physician orders, patient diagnosis and results of clinical assessment     Frequency/Duration:  1-2 x's a week for 12 sessions  Specific OT Treatment to include:      [x]? Instruction in HEP                   Modalities:  [x]?  Therapeutic Exercise                 [x]? Ultrasound               []? Electrical Stimulation/Attended  [x]? PROM/Stretching                    [x]? Fluidotherapy          [x]?   Paraffin                   []? AAROM  [x]?  AROM                 []? Iontophoresis:   [x]? Magdaline Chin                                       []? Neuromuscular Re-Ed            []? ADL/IADL re-training    [x]?  Therapeutic Activity                  [x]? Pain Management with/without modalities PRN                 [x]? Liana Haywood []? Splinting                                   [x]? Scar Management                   []?Joint Protection/Training  []? Ergonomics                             [x]?  Joint Mobilization                      []? Adaptive Equipment Assessment/Training                             [x]?  Manual Edema Mobilization   []? Myofascial Release                 []? Energy Conservation/Work Simplification  [x]? GM/FM Coordination                []? Safety retraining/education per  individual diagnosis/goals  [x]? Desensitization        Patient Specific Goal: To use her L hand again and get good motion.                              GOALS (Long term same as Short term):  1. ) Patient will demonstrate good understanding of home program (exercises/activities/diagnosis/prognosis/goals) with good accuracy. Goal met and continues to follow instructions with new HEP  2. ) Patient will demonstrate increased active/passive range of motion of their Left wrist and forearm to [facility] for ADL/IADL completion. Goal progressing-still is unable to    3. ) Patient will demonstrate increased /pinch strength of at least 10 / 5 pinch pounds of their Left hand. Goal progressing, first assessed this date. 4. ) Patient to report decreased pain in their affected Left  upper extremity from 5/10 to 2/10 or less with resistive functional use. Goal met. 5. ) Patient to report 100% compliance with their splint wear, care, and precautions if needed. Goal met. 6. ) Patient will be knowledgeable of edema control techniques as evident with decreases from slight  To none. Goal met. 7. ) Patient will demonstrate a non-tender/non-adherent scar. Goal met. 8. ) Patient will report ADL functions as Mod I/I using L UE . Goal met. 9. ) Patient will decrease QuickDASH score to 30% or less for increased participation in daily functional activities. Goal met-rated 16% this date.        Subjective: Pt stated \"I have been doing a lot of [employer] for [employer] next week. Objective:  Updated POC to be completed by 10th visit     INTERVENTION: COMPLETED: SPECIFICS/COMMENTS:   Modality:     Paraffin Bath  x 10 mins    MH  5 mins    AROM:     L wrist and forearm  X           All planes   -Jux-a-cisor (6 reps)  -blue ball- pronation/supination   -bean transfer with spoon to increase forearm supination/pronation  -Exercise spheres.    -blanket folding ax. AAROM:               PROM/Stretching:     L wrist and forearm  X  x All planes  -prayer stretches- 5 reps with 20 sec holds. Scar Mass/Edema Control:     Scar massage  x         Strengthening:     L hand strengthening   Light (yellow theraputty) - - 20 reps and roll/pinch- 3 times, manipulation of items out of putty (10 pegs)-^d from soft putty    x -hand gripper setting 3- Dynamic-25 reps Static- 10 reps with 10 count.   -green resistive clip-medium resistance-picking up 30 pom poms for 3pt pinch strengthening  -2.6# Medicine ball lift an place- 20 reps    L wrist and forearm strengthening X  x    X    X  X    X  x -PRE's- 3# 15 reps each flexion/extion, RD/UD  -UBE- 3 mins forward/3 mins backwards with BUE's (1 min each way with LUE)   -Cable machine-High, middle and low rows with 10# wt.- 10 reps Bicep curls- 5# wt- 10 reps Push outs- 5# wt - 10 reps   -2# Wt'd dowel ^d from 1#- 20 reps each- pro/supination, RD/UD.   -10# Theraband bar (red)- 15 reps each- pronation, supination and twists  -wt'd cart -6 times around clinic.  -23# wt'd box lift and place- 10 reps   -Mr. Sandrita Rosales with 2# wt. 4 reps    Other: HEP     Wrist AROM ex's x    Prayer stretches x      Assessment/Comments: Pt is making Fair + progress toward stated plan of care. Pt. Tolerated all new and existing resistive exercises today.  Will continue to advance as tolerated.        -Rehab Potential: Good  -Requires OT Follow Up: Yes  Time In: 1300  Time Out: 1355             Visit #:13    Treatment Charges: Mins

## 2021-07-23 ENCOUNTER — TREATMENT (OUTPATIENT)
Dept: OCCUPATIONAL THERAPY | Age: 76
End: 2021-07-23
Payer: MEDICARE

## 2021-07-23 DIAGNOSIS — S52.532A CLOSED COLLES' FRACTURE OF LEFT RADIUS, INITIAL ENCOUNTER: Primary | ICD-10-CM

## 2021-07-23 PROCEDURE — 97018 PARAFFIN BATH THERAPY: CPT | Performed by: OCCUPATIONAL THERAPY ASSISTANT

## 2021-07-23 PROCEDURE — 97110 THERAPEUTIC EXERCISES: CPT | Performed by: OCCUPATIONAL THERAPY ASSISTANT

## 2021-07-23 PROCEDURE — 97140 MANUAL THERAPY 1/> REGIONS: CPT | Performed by: OCCUPATIONAL THERAPY ASSISTANT

## 2021-07-23 NOTE — PROGRESS NOTES
[]? Splinting                                   [x]? Scar Management                   []?Joint Protection/Training  []? Ergonomics                             [x]?  Joint Mobilization                      []? Adaptive Equipment Assessment/Training                             [x]?  Manual Edema Mobilization   []? Myofascial Release                 []? Energy Conservation/Work Simplification  [x]? GM/FM Coordination                []? Safety retraining/education per  individual diagnosis/goals  [x]? Desensitization        Patient Specific Goal: To use her L hand again and get good motion.                              GOALS (Long term same as Short term):  1. ) Patient will demonstrate good understanding of home program (exercises/activities/diagnosis/prognosis/goals) with good accuracy. Goal met and continues to follow instructions with new HEP  2. ) Patient will demonstrate increased active/passive range of motion of their Left wrist and forearm to [facility] for ADL/IADL completion. Goal progressing-still is unable to    3. ) Patient will demonstrate increased /pinch strength of at least 10 / 5 pinch pounds of their Left hand. Goal progressing, first assessed this date. 4. ) Patient to report decreased pain in their affected Left  upper extremity from 5/10 to 2/10 or less with resistive functional use. Goal met. 5. ) Patient to report 100% compliance with their splint wear, care, and precautions if needed. Goal met. 6. ) Patient will be knowledgeable of edema control techniques as evident with decreases from slight  To none. Goal met. 7. ) Patient will demonstrate a non-tender/non-adherent scar. Goal met. 8. ) Patient will report ADL functions as Mod I/I using L UE . Goal met. 9. ) Patient will decrease QuickDASH score to 30% or less for increased participation in daily functional activities. Goal met-rated 16% this date. Subjective: Pt presented with no new complaints. Visit #:14    Treatment Charges: Mins Units   Modalities paraffin 10 1   Ther Exercise 35 2   Manual Therapy 10 1   Thera Activities     ADL/Home Mgt      Neuro Re-education     Group Therapy     Non-Billable Service Time     Other     Total Time/Units 55 4       -Response to Treatment: Fair+ Pt. Is happy that her pain levels are minimal with exercise. Goals: Goals for pt can be see on initial eval occurring on  5/24/21    Plan:   [x]  Continues Plan of care: Focus on strengthening and AROM of LUE. Pt education continues at each visit to obtain maximum benefits from skilled OT intervention.   []  [address] of care:   []  Discharge:      Anel BENDER/ESTELA [id number]

## 2021-07-28 ENCOUNTER — TREATMENT (OUTPATIENT)
Dept: OCCUPATIONAL THERAPY | Age: 76
End: 2021-07-28
Payer: MEDICARE

## 2021-07-28 DIAGNOSIS — S52.532A CLOSED COLLES' FRACTURE OF LEFT RADIUS, INITIAL ENCOUNTER: Primary | ICD-10-CM

## 2021-07-28 PROCEDURE — 97110 THERAPEUTIC EXERCISES: CPT | Performed by: OCCUPATIONAL THERAPIST

## 2021-07-28 PROCEDURE — 97140 MANUAL THERAPY 1/> REGIONS: CPT | Performed by: OCCUPATIONAL THERAPIST

## 2021-07-28 PROCEDURE — 97018 PARAFFIN BATH THERAPY: CPT | Performed by: OCCUPATIONAL THERAPIST

## 2021-07-28 NOTE — PROGRESS NOTES
OCCUPATIONAL THERAPY PROGRESS NOTE    Date:  2021  Initial Evaluation Date: 21     Patient Name:  Josee Rashid    :  1945  Restrictions/Precautions: Follow ORIF DRF protocol , low fall risk  Diagnosis:  L DRF  ORIF   s52.502A                                                           Date of Surgery/Injury: sx 2021      Insurance/Certification information:  [employer] Medicare  Plan of care signed (Y/N): Y thru 21  Visit# / total visits: 15/ 18    Pain Level: 0-1 on scale of 1-10, aching     Referring Practitioner:  Dr. Keith Chauhan  Specific Practitioner Orders: NWB, velcro brace - given by ,  ROM as tolerated, Initiate light strengthening today.      Assessment of current deficits   []? Functional mobility             [x]? ADLs          [x]? Strength                  []? Cognition   []? Functional transfers           []? IADLs         []? Safety Awareness  [x]? Endurance   []? Fine Motor Coordination    []? Balance      []? Vision/perception   []? Sensation     []? Gross Motor Coordination [x]? ROM           [x]? Pain                        [x]? Edema          [x]? Scar Adhesion/Skin Integrity      OT PLAN OF CARE   OT POC based on physician orders, patient diagnosis and results of clinical assessment     Frequency/Duration:  1-2 x's a week for 12 sessions  Specific OT Treatment to include:      [x]? Instruction in HEP                   Modalities:  [x]?  Therapeutic Exercise                 [x]? Ultrasound               []? Electrical Stimulation/Attended  [x]? PROM/Stretching                    [x]? Fluidotherapy          [x]?   Paraffin                   []? AAROM  [x]?  AROM                 []? Iontophoresis:   [x]? Sophia Ni                                       []? Neuromuscular Re-Ed            []? ADL/IADL re-training    [x]?  Therapeutic Activity                  [x]? Pain Management with/without modalities PRN                 [x]? Isidro Gupta []? Splinting                                   [x]? Scar Management                   []?Joint Protection/Training  []? Ergonomics                             [x]?  Joint Mobilization                      []? Adaptive Equipment Assessment/Training                             [x]?  Manual Edema Mobilization   []? Myofascial Release                 []? Energy Conservation/Work Simplification  [x]? GM/FM Coordination                []? Safety retraining/education per  individual diagnosis/goals  [x]? Desensitization        Patient Specific Goal: To use her L hand again and get good motion.                              GOALS (Long term same as Short term):  1. ) Patient will demonstrate good understanding of home program (exercises/activities/diagnosis/prognosis/goals) with good accuracy. Goal met and continues to follow instructions with new HEP  2. ) Patient will demonstrate increased active/passive range of motion of their Left wrist and forearm to [facility] for ADL/IADL completion. Goal progressing-still is unable to    3. ) Patient will demonstrate increased /pinch strength of at least 10 / 5 pinch pounds of their Left hand. Goal progressing, first assessed this date. 4. ) Patient to report decreased pain in their affected Left  upper extremity from 5/10 to 2/10 or less with resistive functional use. Goal met. 5. ) Patient to report 100% compliance with their splint wear, care, and precautions if needed. Goal met. 6. ) Patient will be knowledgeable of edema control techniques as evident with decreases from slight  To none. Goal met. 7. ) Patient will demonstrate a non-tender/non-adherent scar. Goal met. 8. ) Patient will report ADL functions as Mod I/I using L UE . Goal met. 9. ) Patient will decrease QuickDASH score to 30% or less for increased participation in daily functional activities. Goal met-rated 16% this date.        Subjective: Pt presented with no new changes Objective:  Updated POC to be completed by 10th visit     INTERVENTION: COMPLETED: SPECIFICS/COMMENTS:   Modality:     Paraffin Bath  x 10 mins    MH  5 mins    AROM:     L wrist and forearm  X           All planes   -Jux-a-cisor (6 reps)  -blue ball- pronation/supination   -bean transfer with spoon to increase forearm supination/pronation  -Exercise spheres.    -blanket folding ax. AAROM:               PROM/Stretching:     L wrist and forearm  X  x All planes  -prayer stretches- 5 reps with 20 sec holds. Scar Mass/Edema Control:     Scar massage  x         Strengthening:     L hand strengthening   Light (yellow theraputty) - - 20 reps and roll/pinch- 3 times, manipulation of items out of putty (10 pegs)-^d from soft putty    x -hand gripper setting 3- Dynamic-25 reps Static- 10 reps with 10 count.   -green resistive clip-medium resistance-picking up 30 pom poms for 3pt pinch strengthening  -2.6# Medicine ball lift an place- 20 reps    L wrist and forearm strengthening X      X    X  X    X    x    x -PRE's- 3# 15 reps each flexion/extion, RD/UD  -UBE- 3 mins forward/3 mins backwards with BUE's (1 min each way with LUE)   -Cable machine-High, middle and low rows with 10# wt.- 10 reps Bicep curls- 5# wt- 10 reps Push outs- 5# wt - 10 reps   -2# Wt'd dowel ^d from 1#- 20 reps each- pro/supination, RD/UD.   -10# Theraband bar (red)- 20 ^d from 15 reps each- pronation, supination and twists  -10# therabar isometric shaking for 2 mins  -wt'd cart -6 times around clinic.  -23# wt'd box lift and place- 10 reps   -Mr. Silas Fontenot with 2# wt. 4 reps   -functional overhead reaching on window with 3, 4, 5# weights and weighted ball, 3 full reps   Other: HEP     Wrist AROM ex's x    Prayer stretches x      Assessment/Comments: Pt is making Fair + progress toward stated plan of care.  Pt tolerated increased strengthening this date with isometric strengthening and overhead functional reaching with weight overhead to place on an overhead shelf. Will continue to advance as tolerated.        -Rehab Potential: Good  -Requires OT Follow Up: Yes  Time In: 1300  Time Out: 1355             Visit #:15    Treatment Charges: Mins Units   Modalities paraffin 10 1   Ther Exercise 35 2   Manual Therapy 10 1   Thera Activities     ADL/Home Mgt      Neuro Re-education     Group Therapy     Non-Billable Service Time     Other     Total Time/Units 55 4       -Response to Treatment: Fair+ Pt. Is happy that her pain levels are minimal with exercise. Goals: Goals for pt can be see on initial eval occurring on  5/24/21    Plan:   [x]  Continues Plan of care: Focus on strengthening and AROM of LUE. Pt education continues at each visit to obtain maximum benefits from skilled OT intervention. []  Alter Plan of care:   []  Discharge:       [address] [facility], OTR/L #[id number]

## 2021-08-09 ENCOUNTER — TREATMENT (OUTPATIENT)
Dept: OCCUPATIONAL THERAPY | Age: 76
End: 2021-08-09
Payer: MEDICARE

## 2021-08-09 DIAGNOSIS — S52.532A CLOSED COLLES' FRACTURE OF LEFT RADIUS, INITIAL ENCOUNTER: Primary | ICD-10-CM

## 2021-08-09 PROCEDURE — 97018 PARAFFIN BATH THERAPY: CPT | Performed by: OCCUPATIONAL THERAPY ASSISTANT

## 2021-08-09 PROCEDURE — 97110 THERAPEUTIC EXERCISES: CPT | Performed by: OCCUPATIONAL THERAPY ASSISTANT

## 2021-08-09 PROCEDURE — 97140 MANUAL THERAPY 1/> REGIONS: CPT | Performed by: OCCUPATIONAL THERAPY ASSISTANT

## 2021-08-09 NOTE — PROGRESS NOTES
OCCUPATIONAL THERAPY PROGRESS NOTE    Date:  2021  Initial Evaluation Date: 21     Patient Name:  Eric Dhaliwal    :  1945  Restrictions/Precautions: Follow ORIF DRF protocol , low fall risk  Diagnosis:  L DRF  ORIF   s52.502A                                                           Date of Surgery/Injury: sx 2021      Insurance[facility]Certification information[facility]  [facility]   Plan of care signed (Y/N): Y thru 21  Visit# / total visits:  18    Pain Level: 0-1 on scale of 1-10, aching     Referring Practitioner:  Dr. Kassie Alexis  Specific Practitioner Orders: NWB, velcro brace - given by ,  ROM as tolerated, Initiate light strengthening today.      Assessment of current deficits   []? Functional mobility             [x]? ADLs          [x]? Strength                  []? Cognition   []? Functional transfers           []? IADLs         []? Safety Awareness  [x]? Endurance   []? Fine Motor Coordination    []? Balance      []? Vision/perception   []? Sensation     []? Gross Motor Coordination [x]? ROM           [x]? Pain                        [x]? Edema          [x]? Scar Adhesion/Skin Integrity      OT PLAN OF CARE   OT POC based on physician orders, patient diagnosis and results of clinical assessment     Frequency/Duration:  1-2 x's a week for 12 sessions  Specific OT Treatment to include:      [x]? Instruction in HEP                   Modalities:  [x]?  Therapeutic Exercise                 [x]? Ultrasound               []? Electrical Stimulation/Attended  [x]? PROM/Stretching                    [x]? Fluidotherapy          [x]?   Paraffin                   []? AAROM  [x]?  AROM                 []? Iontophoresis:   [x]? Catia Aponte                                       []? Neuromuscular Re-Ed            []? ADL/IADL re-training    [x]?  Therapeutic Activity                  [x]? Pain Management with/without modalities PRN                 [x]? Sarah Schmitt []? Splinting                                   [x]? Scar Management                   []?Joint Protection/Training  []? Ergonomics                             [x]?  Joint Mobilization                      []? Adaptive Equipment Assessment/Training                             [x]?  Manual Edema Mobilization   []? Myofascial Release                 []? Energy Conservation/Work Simplification  [x]? GM/FM Coordination                []? Safety retraining/education per  individual diagnosis/goals  [x]? Desensitization        Patient Specific Goal: To use her L hand again and get good motion.                              GOALS (Long term same as Short term):  1. ) Patient will demonstrate good understanding of home program (exercises/activities/diagnosis/prognosis/goals) with good accuracy. Goal met and continues to follow instructions with new HEP  2. ) Patient will demonstrate increased active/passive range of motion of their Left wrist and forearm to [facility] for ADL/IADL completion. Goal progressing-still is unable to    3. ) Patient will demonstrate increased /pinch strength of at least 10 / 5 pinch pounds of their Left hand. Goal progressing, first assessed this date. 4. ) Patient to report decreased pain in their affected Left  upper extremity from 5/10 to 2/10 or less with resistive functional use. Goal met. 5. ) Patient to report 100% compliance with their splint wear, care, and precautions if needed. Goal met. 6. ) Patient will be knowledgeable of edema control techniques as evident with decreases from slight  To none. Goal met. 7. ) Patient will demonstrate a non-tender/non-adherent scar. Goal met. 8. ) Patient will report ADL functions as Mod I/I using L UE . Goal met. 9. ) Patient will decrease QuickDASH score to 30% or less for increased participation in daily functional activities. Goal met-rated 16% this date.        Subjective: Pt presented with no new changes Objective:  Updated POC to be completed by 10th visit     INTERVENTION: COMPLETED: SPECIFICS/COMMENTS:   Modality:     Paraffin Bath  x 10 mins    MH  5 mins    AROM:     L wrist and forearm  X           All planes   -Jux-a-cisor (6 reps)  -blue ball- pronation/supination   -bean transfer with spoon to increase forearm supination/pronation  -Exercise spheres.    -blanket folding ax. AAROM:               PROM/Stretching:     L wrist and forearm  X  x All planes  -prayer stretches- 5 reps with 20 sec holds. Scar Mass/Edema Control:     Scar massage  x         Strengthening:     L hand strengthening   Light (yellow theraputty) - - 20 reps and roll/pinch- 3 times, manipulation of items out of putty (10 pegs)-^d from soft putty    x -hand gripper setting 3- Dynamic-25 reps Static- 10 reps with 10 count.   -green resistive clip-medium resistance-picking up 30 pom poms for 3pt pinch strengthening  -2.6# Medicine ball lift an place- 20 reps    L wrist and forearm strengthening X  x    X    X  X      x        x -PRE's- 3# 15 reps each flexion/extion, RD/UD  -UBE- 3 mins forward/3 mins backwards with BUE's (1 min each way with LUE)   -Cable machine-High, middle and low rows with 20# wt.- 10 reps Bicep curls- 5# wt- 10 reps Push outs- 5# wt - 10 reps x2  -2# Wt'd dowel ^d from 1#- 20 reps each- pro/supination, RD/UD.   -10# Theraband bar (red)- 20 reps  each- pronation, supination and twists  -10# therabar isometric shaking for 2 mins  -wt'd cart -6 times around clinic.  -23# wt'd box lift and place- 10 reps   -Mr. Chuck Schuster with 2# wt. 4 reps   -functional overhead reaching on window with 3, 4, 5# weights and weighted ball, 3 full reps   Other: HEP     Wrist AROM ex's x    Prayer stretches x      Assessment/Comments: Pt is making Fair + progress toward stated plan of care. Pt tolerated increased weight with resistance today. Will see for 2 more sessions and then transition to HEP.        -Rehab Potential: Good  -Requires OT Follow Up: Yes  Time In: 1300  Time Out: 1355             Visit #:16    Treatment Charges: Mins Units   Modalities paraffin 10 1   Ther Exercise 35 2   Manual Therapy 10 1   Thera Activities     ADL/Home Mgt      Neuro Re-education     Group Therapy     Non-Billable Service Time     Other     Total Time/Units 55 4       -Response to Treatment: Fair+ Pt. Is happy that her pain levels are minimal with exercise. Goals: Goals for pt can be see on initial eval occurring on  5/24/21    Plan:   [x]  Continues Plan of care: Focus on strengthening and AROM of LUE. Pt education continues at each visit to obtain maximum benefits from skilled OT intervention.   []  [address] of care:   []  Discharge:      Sarika BENDER/ESTELA [id number]

## 2021-08-13 ENCOUNTER — TREATMENT (OUTPATIENT)
Dept: OCCUPATIONAL THERAPY | Age: 76
End: 2021-08-13
Payer: MEDICARE

## 2021-08-13 DIAGNOSIS — S52.532A CLOSED COLLES' FRACTURE OF LEFT RADIUS, INITIAL ENCOUNTER: Primary | ICD-10-CM

## 2021-08-13 PROCEDURE — 97140 MANUAL THERAPY 1/> REGIONS: CPT | Performed by: OCCUPATIONAL THERAPY ASSISTANT

## 2021-08-13 PROCEDURE — 97018 PARAFFIN BATH THERAPY: CPT | Performed by: OCCUPATIONAL THERAPY ASSISTANT

## 2021-08-13 PROCEDURE — 97110 THERAPEUTIC EXERCISES: CPT | Performed by: OCCUPATIONAL THERAPY ASSISTANT

## 2021-08-13 NOTE — PROGRESS NOTES
OCCUPATIONAL THERAPY PROGRESS NOTE    Date:  2021  Initial Evaluation Date: 21     Patient Name:  Carley Valencia    :  1945  Restrictions/Precautions: Follow ORIF DRF protocol , low fall risk  Diagnosis:  L DRF  ORIF   s52.502A                                                           Date of Surgery/Injury: sx 2021      Insurance/Certification information:  [employer] Medicare   Plan of care signed (Y/N): Y thru 21  Visit# / total visits:  18    Pain Level: 0-1 on scale of 1-10, aching     Referring Practitioner:  Dr. Frances Jessica  Specific Practitioner Orders: NWB, velcro brace - given by ,  ROM as tolerated, Initiate light strengthening today.      Assessment of current deficits   []? Functional mobility             [x]? ADLs          [x]? Strength                  []? Cognition   []? Functional transfers           []? IADLs         []? Safety Awareness  [x]? Endurance   []? Fine Motor Coordination    []? Balance      []? Vision/perception   []? Sensation     []? Gross Motor Coordination [x]? ROM           [x]? Pain                        [x]? Edema          [x]? Scar Adhesion/Skin Integrity      OT PLAN OF CARE   OT POC based on physician orders, patient diagnosis and results of clinical assessment     Frequency/Duration:  1-2 x's a week for 12 sessions  Specific OT Treatment to include:      [x]? Instruction in HEP                   Modalities:  [x]?  Therapeutic Exercise                 [x]? Ultrasound               []? Electrical Stimulation/Attended  [x]? PROM/Stretching                    [x]? Fluidotherapy          [x]?   Paraffin                   []? AAROM  [x]?  AROM                 []? Iontophoresis:   [x]? Geoffry Conch                                       []? Neuromuscular Re-Ed            []? ADL/IADL re-training    [x]?  Therapeutic Activity                  [x]? Pain Management with/without modalities PRN                 [x]? David Manzo []? Splinting                                   [x]? Scar Management                   []?Joint Protection/Training  []? Ergonomics                             [x]?  Joint Mobilization                      []? Adaptive Equipment Assessment/Training                             [x]?  Manual Edema Mobilization   []? Myofascial Release                 []? Energy Conservation/Work Simplification  [x]? GM/FM Coordination                []? Safety retraining/education per  individual diagnosis/goals  [x]? Desensitization        Patient Specific Goal: To use her L hand again and get good motion.                              GOALS (Long term same as Short term):  1. ) Patient will demonstrate good understanding of home program (exercises/activities/diagnosis/prognosis/goals) with good accuracy. Goal met and continues to follow instructions with new HEP  2. ) Patient will demonstrate increased active/passive range of motion of their Left wrist and forearm to [facility] for ADL/IADL completion. Goal progressing-still is unable to    3. ) Patient will demonstrate increased /pinch strength of at least 10 / 5 pinch pounds of their Left hand. Goal progressing, first assessed this date. 4. ) Patient to report decreased pain in their affected Left  upper extremity from 5/10 to 2/10 or less with resistive functional use. Goal met. 5. ) Patient to report 100% compliance with their splint wear, care, and precautions if needed. Goal met. 6. ) Patient will be knowledgeable of edema control techniques as evident with decreases from slight  To none. Goal met. 7. ) Patient will demonstrate a non-tender/non-adherent scar. Goal met. 8. ) Patient will report ADL functions as Mod I/I using L UE . Goal met. 9. ) Patient will decrease QuickDASH score to 30% or less for increased participation in daily functional activities. Goal met-rated 16% this date. Subjective: Pt presented with no new complaints. Objective:  Updated POC to be completed by 10th visit     INTERVENTION: COMPLETED: SPECIFICS/COMMENTS:   Modality:     Paraffin Bath  x 10 mins    MH  5 mins    AROM:     L wrist and forearm  X           All planes   -Jux-a-cisor (6 reps)  -blue ball- pronation/supination   -bean transfer with spoon to increase forearm supination/pronation  -Exercise spheres.    -blanket folding ax. AAROM:               PROM/Stretching:     L wrist and forearm  X  x All planes  -prayer stretches- 5 reps with 20 sec holds. Scar Mass/Edema Control:     Scar massage  x         Strengthening:     L hand strengthening   Light (yellow theraputty) - - 20 reps and roll/pinch- 3 times, manipulation of items out of putty (10 pegs)-^d from soft putty    x -hand gripper setting 3- Dynamic-25 reps Static- 10 reps with 10 count.   -green resistive clip-medium resistance-picking up 30 pom poms for 3pt pinch strengthening  -2.6# Medicine ball lift an place- 20 reps    L wrist and forearm strengthening X  x    X    X  X              x -PRE's-4# ^66 reps each flexion/extion, RD/UD  -UBE- 3 mins forward/3 mins backwards with BUE's (1 min each way with LUE)   -Cable machine-High, middle and low rows with 25# wt.- 10 reps x2 Bicep curls- 5# wt- 10 reps Push outs- 5# wt - 10 reps x2  -2# Wt'd dowel ^d from 1#- 20 reps each- pro/supination, RD/UD.   -10# Theraband bar (red)- 20 reps  each- pronation, supination and twists  -10# therabar isometric shaking for 2 mins  -wt'd cart -6 times around clinic.  -23# wt'd box lift and place- 10 reps   -Mr. Rashad Knox with 2# wt. 4 reps   -functional overhead reaching on window with 3, 4, 5# weights and weighted ball, 3 full reps   Other: HEP     Wrist AROM ex's x    Prayer stretches x      Assessment/Comments: Pt is making Fair + progress toward stated plan of care. Pt tolerated increased weight with resistance today. Will see for 1 more sessions and then transition to HEP.        -Rehab Potential: Good  -Requires OT Follow Up: Yes  Time In: 1300  Time Out: 1400            Visit #:17    Treatment Charges: Mins Units   Modalities paraffin 10 1   Ther Exercise 40 2   Manual Therapy 10 1   Thera Activities     ADL/Home Mgt      Neuro Re-education     Group Therapy     Non-Billable Service Time     Other     Total Time/Units 60 4       -Response to Treatment: Fair+ Pt. Is happy that her pain levels are minimal with exercise. Goals: Goals for pt can be see on initial eval occurring on  5/24/21    Plan:   [x]  Continues Plan of care: Focus on strengthening and AROM of LUE. Transition to HEP after next visit. Pt education continues at each visit to obtain maximum benefits from skilled OT intervention.   []  [address] of care:   []  Discharge:      Jyoti BENDER/L [id number]

## 2021-08-16 ENCOUNTER — TREATMENT (OUTPATIENT)
Dept: OCCUPATIONAL THERAPY | Age: 76
End: 2021-08-16
Payer: MEDICARE

## 2021-08-16 DIAGNOSIS — S52.532A CLOSED COLLES' FRACTURE OF LEFT RADIUS, INITIAL ENCOUNTER: Primary | ICD-10-CM

## 2021-08-16 PROCEDURE — 97140 MANUAL THERAPY 1/> REGIONS: CPT | Performed by: OCCUPATIONAL THERAPY ASSISTANT

## 2021-08-16 PROCEDURE — 97018 PARAFFIN BATH THERAPY: CPT | Performed by: OCCUPATIONAL THERAPY ASSISTANT

## 2021-08-16 PROCEDURE — 97530 THERAPEUTIC ACTIVITIES: CPT | Performed by: OCCUPATIONAL THERAPY ASSISTANT

## 2021-08-16 PROCEDURE — 97110 THERAPEUTIC EXERCISES: CPT | Performed by: OCCUPATIONAL THERAPY ASSISTANT

## 2021-08-16 NOTE — PROGRESS NOTES
OCCUPATIONAL THERAPY PROGRESS NOTE/DISCHARGE SUMMARY    Date:  2021  Initial Evaluation Date: 21     Patient Name:  Diana Weems    :  1945  Restrictions/Precautions: Follow ORIF DRF protocol , low fall risk  Diagnosis:  L DRF  ORIF   s52.502A                                                           Date of Surgery/Injury: sx 2021      Insurance/Certification information:  [employer]na Medicare   Plan of care signed (Y/N): Y thru 21  Visit# / total visits:     Pain Level: 0-1 on scale of 1-10, aching     Referring Practitioner:  Dr. Herrera Keep  Specific Practitioner Orders: NWB, velcro brace - given by ,  ROM as tolerated, Initiate light strengthening today.      Assessment of current deficits   []? Functional mobility             [x]? ADLs          [x]? Strength                  []? Cognition   []? Functional transfers           []? IADLs         []? Safety Awareness  [x]? Endurance   []? Fine Motor Coordination    []? Balance      []? Vision/perception   []? Sensation     []? Gross Motor Coordination [x]? ROM           [x]? Pain                        [x]? Edema          [x]? Scar Adhesion/Skin Integrity      OT PLAN OF CARE   OT POC based on physician orders, patient diagnosis and results of clinical assessment     Frequency/Duration:  1-2 x's a week for 12 sessions  Specific OT Treatment to include:      [x]? Instruction in HEP                   Modalities:  [x]?  Therapeutic Exercise                 [x]? Ultrasound               []? Electrical Stimulation/Attended  [x]? PROM/Stretching                    [x]? Fluidotherapy          [x]?   Paraffin                   []? AAROM  [x]?  AROM                 []? Iontophoresis:   [x]? Anthony Jacob                                       []? Neuromuscular Re-Ed            []? ADL/IADL re-training    [x]?  Therapeutic Activity                  [x]? Pain Management with/without modalities PRN                 [x]? Nidhi Johnson []? Splinting                                   [x]? Scar Management                   []?Joint Protection/Training  []? Ergonomics                             [x]?  Joint Mobilization                      []? Adaptive Equipment Assessment/Training                             [x]?  Manual Edema Mobilization   []? Myofascial Release                 []? Energy Conservation/Work Simplification  [x]? GM/FM Coordination                []? Safety retraining/education per  individual diagnosis/goals  [x]? Desensitization        Patient Specific Goal: To use her L hand again and get good motion.                              GOALS (Long term same as Short term):  1. ) Patient will demonstrate good understanding of home program (exercises/activities/diagnosis/prognosis/goals) with good accuracy. Goal met. 2. ) Patient will demonstrate increased active/passive range of motion of their Left wrist and forearm to [facility] for ADL/IADL completion. Goal Met. See measurements below. 3. ) Patient will demonstrate increased /pinch strength of at least 10 / 5 pinch pounds of their Left hand. Goal partially met.  strength- increased from 20# to 30# lateral pinch-increased from 7# to 8# and 3 point palmar pinch increased from 7# to 9.5#.   4. ) Patient to report decreased pain in their affected Left  upper extremity from 5/10 to 2/10 or less with resistive functional use. Goal met. 5. ) Patient to report 100% compliance with their splint wear, care, and precautions if needed. Goal met. 6. ) Patient will be knowledgeable of edema control techniques as evident with decreases from slight  To none. Goal met. 7. ) Patient will demonstrate a non-tender/non-adherent scar. Goal met. 8. ) Patient will report ADL functions as Mod I/I using L UE . Goal met. 9. ) Patient will decrease QuickDASH score to 30% or less for increased participation in daily functional activities.   Goal met-rated 16%.      Subjective: Pt stated \" I was able to move furniture today\"      Objective:  Updated POC to be completed by 10th visit     INTERVENTION: COMPLETED: SPECIFICS/COMMENTS:   Modality:     Paraffin Bath  x 10 mins    MH  5 mins    AROM:     L wrist and forearm  X           All planes   -Jux-a-cisor (6 reps)  -blue ball- pronation/supination   -bean transfer with spoon to increase forearm supination/pronation  -Exercise spheres.    -blanket folding ax. AAROM:               PROM/Stretching:     L wrist and forearm  X  x All planes  -prayer stretches- 5 reps with 20 sec holds. Scar Mass/Edema Control:     Scar massage  x         Strengthening:     L hand strengthening   Light (yellow theraputty) - - 20 reps and roll/pinch- 3 times, manipulation of items out of putty (10 pegs)-^d from soft putty    x -hand gripper setting 3- Dynamic-25 reps Static- 10 reps with 10 count.   -green resistive clip-medium resistance-picking up 30 pom poms for 3pt pinch strengthening  -2.6# Medicine ball lift an place- 20 reps    L wrist and forearm strengthening X  x        X  X              x -PRE's-4# ^03 reps each flexion/extion, RD/UD  -UBE- 3 mins forward/3 mins backwards with BUE's (1 min each way with LUE)   -Cable machine-High, middle and low rows with 25# wt.- 10 reps x2 Bicep curls- 5# wt- 10 reps Push outs- 5# wt - 10 reps x2  -2# Wt'd dowel ^d from 1#- 20 reps each- pro/supination, RD/UD.   -10# Theraband bar (red)- 20 reps  each- pronation, supination and twists  -10# therabar isometric shaking for 2 mins  -wt'd cart -6 times around clinic.  -23# wt'd box lift and place- 10 reps   -Mr. Rashad Knox with 2# wt. 4 reps   -functional overhead reaching on window with 3, 4, 5# weights and weighted ball, 3 full reps   Other: HEP     Wrist AROM ex's x    Prayer stretches x      Assessment/Comments: Pt is making Fair + progress toward stated plan of care. Pt tolerated all exercises and measurements today.    Left  Upper Extremity  AROM                                                                     eval             7/7/21 8/16/21  FOREARM Pronation 80-90* 62* R 70*  WNL WNL        Supination 80-90* WNL  WNL WNL         WRIST Flexion 0-70* 40*  68* 75        Extension 0-80* 33*  55* 73        Radial Deviation 0-20* 18*  20* 36        Ulnar Deviation 0-30* 28*  25* 55           -Rehab Potential: Good  -Requires OT Follow Up: Yes  Time In: 1305 Time Out: 1400            Visit #:18    Treatment Charges: Mins Units   Modalities paraffin 10 1   Ther Exercise 20 1   Manual Therapy 10 1   Thera Activities 20 1   ADL/Home Mgt      Neuro Re-education     Group Therapy     Non-Billable Service Time     Other     Total Time/Units 60 4       -Response to Treatment: Fair+ Pt. Is happy that her pain levels are minimal with exercise. Goals: Goals for pt can be see on initial eval occurring on  5/24/21    Plan:   []  Continues Plan of care: Focus on strengthening and AROM of LUE. Transition to HEP after next visit. Pt education continues at each visit to obtain maximum benefits from skilled OT intervention. []  Alter Plan of care:   [x]  Discharge: Continue with HEP.        Theoplis Linen Rising BENDER/L [id number]

## 2021-08-18 DIAGNOSIS — S52.502A CLOSED FRACTURE OF DISTAL END OF LEFT RADIUS, UNSPECIFIED FRACTURE MORPHOLOGY, INITIAL ENCOUNTER: Primary | ICD-10-CM

## 2021-08-19 ENCOUNTER — OFFICE VISIT (OUTPATIENT)
Dept: ORTHOPEDIC SURGERY | Age: 76
End: 2021-08-19
Payer: MEDICARE

## 2021-08-19 VITALS — TEMPERATURE: 98 F | HEIGHT: 65 IN | WEIGHT: 143 LBS | BODY MASS INDEX: 23.82 KG/M2

## 2021-08-19 DIAGNOSIS — S52.502A CLOSED FRACTURE OF DISTAL END OF LEFT RADIUS, UNSPECIFIED FRACTURE MORPHOLOGY, INITIAL ENCOUNTER: Primary | ICD-10-CM

## 2021-08-19 PROCEDURE — 99212 OFFICE O/P EST SF 10 MIN: CPT | Performed by: ORTHOPAEDIC SURGERY

## 2021-08-19 NOTE — PROGRESS NOTES
Ms. Poonam Mead returns today for follow-up of a left distal radius fracture which was treated with ORIF. Date of surgery was 05/07/2021. she reports she is doing well and is slowly returning to activities. Physical Exam:  Left wrist - Skin intact with with a scar over the volar wrist.         Nontender to palpation at the area of the fracture site. ROM   60/60         The incision is healing well without evidence of infection. Pulses are intact and symmetric bilaterally         Strength improved         Sensation intact    Xrays:  Intact fixation hardware at the distal radial metaphysis with progressive   fracture healing and substantial bony union.  Scapholunate dissociation   noted.  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  There is chondrocalcinosis at the TFCC as before. Radiographic findings reviewed with patient    Impression:   Encounter Diagnosis   Name Primary?     Closed fracture of distal end of left radius, unspecified fracture morphology, initial encounter Yes         Plan:   F/u PRN

## 2024-05-05 ENCOUNTER — APPOINTMENT (OUTPATIENT)
Dept: GENERAL RADIOLOGY | Age: 79
End: 2024-05-05
Payer: MEDICARE

## 2024-05-05 ENCOUNTER — HOSPITAL ENCOUNTER (INPATIENT)
Age: 79
LOS: 6 days | Discharge: HOME | End: 2024-05-11
Attending: EMERGENCY MEDICINE | Admitting: FAMILY MEDICINE
Payer: MEDICARE

## 2024-05-05 DIAGNOSIS — J96.01 ACUTE RESPIRATORY FAILURE WITH HYPOXIA (HCC): Primary | ICD-10-CM

## 2024-05-05 DIAGNOSIS — I48.20 CHRONIC ATRIAL FIBRILLATION (HCC): ICD-10-CM

## 2024-05-05 DIAGNOSIS — I48.91 ATRIAL FIBRILLATION WITH RVR (HCC): ICD-10-CM

## 2024-05-05 DIAGNOSIS — I25.10 CAD (CORONARY ARTERY DISEASE): ICD-10-CM

## 2024-05-05 PROBLEM — E03.9 HYPOTHYROIDISM: Status: ACTIVE | Noted: 2024-05-05

## 2024-05-05 PROBLEM — J45.901 ACUTE ASTHMA EXACERBATION: Status: ACTIVE | Noted: 2024-05-05

## 2024-05-05 LAB
ALBUMIN SERPL-MCNC: 4 G/DL (ref 3.5–5.2)
ALP SERPL-CCNC: 75 U/L (ref 35–104)
ALT SERPL-CCNC: 26 U/L (ref 0–32)
ANION GAP SERPL CALCULATED.3IONS-SCNC: 10 MMOL/L (ref 7–16)
AST SERPL-CCNC: 27 U/L (ref 0–31)
B PARAP IS1001 DNA NPH QL NAA+NON-PROBE: NOT DETECTED
B PERT DNA SPEC QL NAA+PROBE: NOT DETECTED
BASOPHILS # BLD: 0.02 K/UL (ref 0–0.2)
BASOPHILS NFR BLD: 0 % (ref 0–2)
BILIRUB SERPL-MCNC: 0.5 MG/DL (ref 0–1.2)
BNP SERPL-MCNC: 4548 PG/ML (ref 0–450)
BUN SERPL-MCNC: 21 MG/DL (ref 6–23)
C PNEUM DNA NPH QL NAA+NON-PROBE: NOT DETECTED
CALCIUM SERPL-MCNC: 9.8 MG/DL (ref 8.6–10.2)
CHLORIDE SERPL-SCNC: 99 MMOL/L (ref 98–107)
CO2 SERPL-SCNC: 29 MMOL/L (ref 22–29)
CREAT SERPL-MCNC: 0.8 MG/DL (ref 0.5–1)
EKG ATRIAL RATE: 115 BPM
EKG Q-T INTERVAL: 276 MS
EKG QRS DURATION: 70 MS
EKG QTC CALCULATION (BAZETT): 369 MS
EKG R AXIS: -109 DEGREES
EKG T AXIS: 78 DEGREES
EKG VENTRICULAR RATE: 108 BPM
EOSINOPHIL # BLD: 0.02 K/UL (ref 0.05–0.5)
EOSINOPHILS RELATIVE PERCENT: 0 % (ref 0–6)
ERYTHROCYTE [DISTWIDTH] IN BLOOD BY AUTOMATED COUNT: 12.5 % (ref 11.5–15)
FLUAV RNA NPH QL NAA+NON-PROBE: NOT DETECTED
FLUBV RNA NPH QL NAA+NON-PROBE: NOT DETECTED
GFR, ESTIMATED: 74 ML/MIN/1.73M2
GLUCOSE SERPL-MCNC: 141 MG/DL (ref 74–99)
HADV DNA NPH QL NAA+NON-PROBE: NOT DETECTED
HCOV 229E RNA NPH QL NAA+NON-PROBE: NOT DETECTED
HCOV HKU1 RNA NPH QL NAA+NON-PROBE: NOT DETECTED
HCOV NL63 RNA NPH QL NAA+NON-PROBE: NOT DETECTED
HCOV OC43 RNA NPH QL NAA+NON-PROBE: NOT DETECTED
HCT VFR BLD AUTO: 44 % (ref 34–48)
HGB BLD-MCNC: 13.9 G/DL (ref 11.5–15.5)
HMPV RNA NPH QL NAA+NON-PROBE: NOT DETECTED
HPIV1 RNA NPH QL NAA+NON-PROBE: NOT DETECTED
HPIV2 RNA NPH QL NAA+NON-PROBE: NOT DETECTED
HPIV3 RNA NPH QL NAA+NON-PROBE: NOT DETECTED
HPIV4 RNA NPH QL NAA+NON-PROBE: NOT DETECTED
IMM GRANULOCYTES # BLD AUTO: 0.03 K/UL (ref 0–0.58)
IMM GRANULOCYTES NFR BLD: 0 % (ref 0–5)
LACTATE BLDV-SCNC: 1.4 MMOL/L (ref 0.5–1.9)
LACTATE BLDV-SCNC: 2.7 MMOL/L (ref 0.5–1.9)
LYMPHOCYTES NFR BLD: 0.49 K/UL (ref 1.5–4)
LYMPHOCYTES RELATIVE PERCENT: 7 % (ref 20–42)
M PNEUMO DNA NPH QL NAA+NON-PROBE: NOT DETECTED
MCH RBC QN AUTO: 28.2 PG (ref 26–35)
MCHC RBC AUTO-ENTMCNC: 31.6 G/DL (ref 32–34.5)
MCV RBC AUTO: 89.2 FL (ref 80–99.9)
MONOCYTES NFR BLD: 0.7 K/UL (ref 0.1–0.95)
MONOCYTES NFR BLD: 9 % (ref 2–12)
NEUTROPHILS NFR BLD: 83 % (ref 43–80)
NEUTS SEG NFR BLD: 6.22 K/UL (ref 1.8–7.3)
PLATELET # BLD AUTO: 237 K/UL (ref 130–450)
PMV BLD AUTO: 10.8 FL (ref 7–12)
POTASSIUM SERPL-SCNC: 4.3 MMOL/L (ref 3.5–5)
PROT SERPL-MCNC: 7.1 G/DL (ref 6.4–8.3)
RBC # BLD AUTO: 4.93 M/UL (ref 3.5–5.5)
RBC # BLD: ABNORMAL 10*6/UL
RBC # BLD: ABNORMAL 10*6/UL
RSV RNA NPH QL NAA+NON-PROBE: DETECTED
RV+EV RNA NPH QL NAA+NON-PROBE: NOT DETECTED
SARS-COV-2 RNA NPH QL NAA+NON-PROBE: NOT DETECTED
SODIUM SERPL-SCNC: 138 MMOL/L (ref 132–146)
SPECIMEN DESCRIPTION: ABNORMAL
TROPONIN I SERPL HS-MCNC: 12 NG/L (ref 0–9)
TROPONIN I SERPL HS-MCNC: 13 NG/L (ref 0–9)
TSH SERPL DL<=0.05 MIU/L-ACNC: 0.87 UIU/ML (ref 0.27–4.2)
WBC OTHER # BLD: 7.5 K/UL (ref 4.5–11.5)

## 2024-05-05 PROCEDURE — 83605 ASSAY OF LACTIC ACID: CPT

## 2024-05-05 PROCEDURE — 6370000000 HC RX 637 (ALT 250 FOR IP): Performed by: EMERGENCY MEDICINE

## 2024-05-05 PROCEDURE — 2580000003 HC RX 258: Performed by: NURSE PRACTITIONER

## 2024-05-05 PROCEDURE — 84145 PROCALCITONIN (PCT): CPT

## 2024-05-05 PROCEDURE — 93005 ELECTROCARDIOGRAM TRACING: CPT | Performed by: FAMILY MEDICINE

## 2024-05-05 PROCEDURE — 6370000000 HC RX 637 (ALT 250 FOR IP): Performed by: FAMILY MEDICINE

## 2024-05-05 PROCEDURE — 6370000000 HC RX 637 (ALT 250 FOR IP): Performed by: NURSE PRACTITIONER

## 2024-05-05 PROCEDURE — 84443 ASSAY THYROID STIM HORMONE: CPT

## 2024-05-05 PROCEDURE — 84484 ASSAY OF TROPONIN QUANT: CPT

## 2024-05-05 PROCEDURE — 6360000002 HC RX W HCPCS: Performed by: NURSE PRACTITIONER

## 2024-05-05 PROCEDURE — 86140 C-REACTIVE PROTEIN: CPT

## 2024-05-05 PROCEDURE — 2060000000 HC ICU INTERMEDIATE R&B

## 2024-05-05 PROCEDURE — 6360000002 HC RX W HCPCS: Performed by: EMERGENCY MEDICINE

## 2024-05-05 PROCEDURE — 93005 ELECTROCARDIOGRAM TRACING: CPT | Performed by: EMERGENCY MEDICINE

## 2024-05-05 PROCEDURE — 99285 EMERGENCY DEPT VISIT HI MDM: CPT

## 2024-05-05 PROCEDURE — 71045 X-RAY EXAM CHEST 1 VIEW: CPT

## 2024-05-05 PROCEDURE — 2580000003 HC RX 258: Performed by: EMERGENCY MEDICINE

## 2024-05-05 PROCEDURE — 0202U NFCT DS 22 TRGT SARS-COV-2: CPT

## 2024-05-05 PROCEDURE — 2500000003 HC RX 250 WO HCPCS

## 2024-05-05 PROCEDURE — 85025 COMPLETE CBC W/AUTO DIFF WBC: CPT

## 2024-05-05 PROCEDURE — 87040 BLOOD CULTURE FOR BACTERIA: CPT

## 2024-05-05 PROCEDURE — 94640 AIRWAY INHALATION TREATMENT: CPT

## 2024-05-05 PROCEDURE — 96374 THER/PROPH/DIAG INJ IV PUSH: CPT

## 2024-05-05 PROCEDURE — APPSS45 APP SPLIT SHARED TIME 31-45 MINUTES: Performed by: NURSE PRACTITIONER

## 2024-05-05 PROCEDURE — 93010 ELECTROCARDIOGRAM REPORT: CPT | Performed by: INTERNAL MEDICINE

## 2024-05-05 PROCEDURE — 83880 ASSAY OF NATRIURETIC PEPTIDE: CPT

## 2024-05-05 PROCEDURE — 80053 COMPREHEN METABOLIC PANEL: CPT

## 2024-05-05 PROCEDURE — 36415 COLL VENOUS BLD VENIPUNCTURE: CPT

## 2024-05-05 RX ORDER — IPRATROPIUM BROMIDE AND ALBUTEROL SULFATE 2.5; .5 MG/3ML; MG/3ML
3 SOLUTION RESPIRATORY (INHALATION)
Status: DISCONTINUED | OUTPATIENT
Start: 2024-05-05 | End: 2024-05-05

## 2024-05-05 RX ORDER — SODIUM CHLORIDE 0.9 % (FLUSH) 0.9 %
5-40 SYRINGE (ML) INJECTION PRN
Status: DISCONTINUED | OUTPATIENT
Start: 2024-05-05 | End: 2024-05-11 | Stop reason: HOSPADM

## 2024-05-05 RX ORDER — METOPROLOL TARTRATE 100 MG/1
100 TABLET ORAL NIGHTLY
COMMUNITY

## 2024-05-05 RX ORDER — POLYETHYLENE GLYCOL 3350 17 G/17G
17 POWDER, FOR SOLUTION ORAL DAILY PRN
Status: DISCONTINUED | OUTPATIENT
Start: 2024-05-05 | End: 2024-05-11 | Stop reason: HOSPADM

## 2024-05-05 RX ORDER — AMOXICILLIN 875 MG/1
875 TABLET, COATED ORAL 2 TIMES DAILY
Status: ON HOLD | COMMUNITY
Start: 2024-05-03 | End: 2024-05-11 | Stop reason: HOSPADM

## 2024-05-05 RX ORDER — SODIUM CHLORIDE 0.9 % (FLUSH) 0.9 %
5-40 SYRINGE (ML) INJECTION EVERY 12 HOURS SCHEDULED
Status: DISCONTINUED | OUTPATIENT
Start: 2024-05-05 | End: 2024-05-11 | Stop reason: HOSPADM

## 2024-05-05 RX ORDER — ONDANSETRON 4 MG/1
4 TABLET, ORALLY DISINTEGRATING ORAL EVERY 8 HOURS PRN
Status: DISCONTINUED | OUTPATIENT
Start: 2024-05-05 | End: 2024-05-11 | Stop reason: HOSPADM

## 2024-05-05 RX ORDER — FLUTICASONE FUROATE AND VILANTEROL 200; 25 UG/1; UG/1
1 POWDER RESPIRATORY (INHALATION) NIGHTLY
COMMUNITY

## 2024-05-05 RX ORDER — ACETAMINOPHEN 650 MG/1
650 SUPPOSITORY RECTAL EVERY 6 HOURS PRN
Status: DISCONTINUED | OUTPATIENT
Start: 2024-05-05 | End: 2024-05-11 | Stop reason: HOSPADM

## 2024-05-05 RX ORDER — MAGNESIUM SULFATE IN WATER 40 MG/ML
2000 INJECTION, SOLUTION INTRAVENOUS PRN
Status: DISCONTINUED | OUTPATIENT
Start: 2024-05-05 | End: 2024-05-11 | Stop reason: HOSPADM

## 2024-05-05 RX ORDER — CETIRIZINE HYDROCHLORIDE 10 MG/1
10 TABLET ORAL DAILY
Status: DISCONTINUED | OUTPATIENT
Start: 2024-05-05 | End: 2024-05-11 | Stop reason: HOSPADM

## 2024-05-05 RX ORDER — ACETAMINOPHEN 325 MG/1
650 TABLET ORAL EVERY 6 HOURS PRN
Status: DISCONTINUED | OUTPATIENT
Start: 2024-05-05 | End: 2024-05-11 | Stop reason: HOSPADM

## 2024-05-05 RX ORDER — PREDNISONE 5 MG/1
TABLET ORAL
COMMUNITY
End: 2024-05-05 | Stop reason: ALTCHOICE

## 2024-05-05 RX ORDER — ONDANSETRON 2 MG/ML
4 INJECTION INTRAMUSCULAR; INTRAVENOUS EVERY 6 HOURS PRN
Status: DISCONTINUED | OUTPATIENT
Start: 2024-05-05 | End: 2024-05-11 | Stop reason: HOSPADM

## 2024-05-05 RX ORDER — METHYLPREDNISOLONE 4 MG/1
4 TABLET ORAL SEE ADMIN INSTRUCTIONS
Status: ON HOLD | COMMUNITY
End: 2024-05-11 | Stop reason: HOSPADM

## 2024-05-05 RX ORDER — AMPICILLIN 500 MG/1
500 CAPSULE ORAL 4 TIMES DAILY
COMMUNITY
End: 2024-05-05 | Stop reason: ALTCHOICE

## 2024-05-05 RX ORDER — LISINOPRIL 5 MG/1
10 TABLET ORAL DAILY
Status: DISCONTINUED | OUTPATIENT
Start: 2024-05-05 | End: 2024-05-11 | Stop reason: HOSPADM

## 2024-05-05 RX ORDER — METOPROLOL TARTRATE 50 MG/1
50 TABLET, FILM COATED ORAL 2 TIMES DAILY
Status: DISCONTINUED | OUTPATIENT
Start: 2024-05-05 | End: 2024-05-05

## 2024-05-05 RX ORDER — MONTELUKAST SODIUM 10 MG/1
10 TABLET ORAL NIGHTLY
Status: DISCONTINUED | OUTPATIENT
Start: 2024-05-05 | End: 2024-05-11 | Stop reason: HOSPADM

## 2024-05-05 RX ORDER — POTASSIUM CHLORIDE 20 MEQ/1
40 TABLET, EXTENDED RELEASE ORAL PRN
Status: DISCONTINUED | OUTPATIENT
Start: 2024-05-05 | End: 2024-05-11 | Stop reason: HOSPADM

## 2024-05-05 RX ORDER — IPRATROPIUM BROMIDE AND ALBUTEROL SULFATE 2.5; .5 MG/3ML; MG/3ML
1 SOLUTION RESPIRATORY (INHALATION)
Status: DISCONTINUED | OUTPATIENT
Start: 2024-05-05 | End: 2024-05-11 | Stop reason: HOSPADM

## 2024-05-05 RX ORDER — POTASSIUM CHLORIDE 7.45 MG/ML
10 INJECTION INTRAVENOUS PRN
Status: DISCONTINUED | OUTPATIENT
Start: 2024-05-05 | End: 2024-05-11 | Stop reason: HOSPADM

## 2024-05-05 RX ORDER — METOPROLOL TARTRATE 1 MG/ML
INJECTION, SOLUTION INTRAVENOUS
Status: COMPLETED
Start: 2024-05-05 | End: 2024-05-05

## 2024-05-05 RX ORDER — METHIMAZOLE 5 MG/1
5 TABLET ORAL EVERY OTHER DAY
Status: DISCONTINUED | OUTPATIENT
Start: 2024-05-06 | End: 2024-05-11 | Stop reason: HOSPADM

## 2024-05-05 RX ORDER — METOPROLOL TARTRATE 50 MG/1
100 TABLET, FILM COATED ORAL NIGHTLY
Status: DISCONTINUED | OUTPATIENT
Start: 2024-05-05 | End: 2024-05-07

## 2024-05-05 RX ORDER — METOPROLOL TARTRATE 1 MG/ML
5 INJECTION, SOLUTION INTRAVENOUS EVERY 6 HOURS PRN
Status: DISCONTINUED | OUTPATIENT
Start: 2024-05-05 | End: 2024-05-11 | Stop reason: HOSPADM

## 2024-05-05 RX ORDER — SODIUM CHLORIDE 9 MG/ML
INJECTION, SOLUTION INTRAVENOUS PRN
Status: DISCONTINUED | OUTPATIENT
Start: 2024-05-05 | End: 2024-05-11 | Stop reason: HOSPADM

## 2024-05-05 RX ORDER — METOPROLOL TARTRATE 50 MG/1
50 TABLET, FILM COATED ORAL EVERY MORNING
Status: DISCONTINUED | OUTPATIENT
Start: 2024-05-06 | End: 2024-05-07

## 2024-05-05 RX ADMIN — IPRATROPIUM BROMIDE AND ALBUTEROL SULFATE 3 DOSE: .5; 2.5 SOLUTION RESPIRATORY (INHALATION) at 12:19

## 2024-05-05 RX ADMIN — METHYLPREDNISOLONE SODIUM SUCCINATE 60 MG: 125 INJECTION INTRAMUSCULAR; INTRAVENOUS at 12:30

## 2024-05-05 RX ADMIN — IPRATROPIUM BROMIDE AND ALBUTEROL SULFATE 1 DOSE: .5; 2.5 SOLUTION RESPIRATORY (INHALATION) at 18:07

## 2024-05-05 RX ADMIN — METOPROLOL TARTRATE 5 MG: 5 INJECTION INTRAVENOUS at 18:22

## 2024-05-05 RX ADMIN — CETIRIZINE HYDROCHLORIDE 10 MG: 10 TABLET, FILM COATED ORAL at 17:05

## 2024-05-05 RX ADMIN — IPRATROPIUM BROMIDE AND ALBUTEROL SULFATE 1 DOSE: .5; 2.5 SOLUTION RESPIRATORY (INHALATION) at 15:20

## 2024-05-05 RX ADMIN — Medication 10 ML: at 20:47

## 2024-05-05 RX ADMIN — MONTELUKAST 10 MG: 10 TABLET, FILM COATED ORAL at 20:46

## 2024-05-05 RX ADMIN — LISINOPRIL 10 MG: 10 TABLET ORAL at 17:05

## 2024-05-05 RX ADMIN — METHYLPREDNISOLONE SODIUM SUCCINATE 40 MG: 40 INJECTION INTRAMUSCULAR; INTRAVENOUS at 20:47

## 2024-05-05 RX ADMIN — AZITHROMYCIN MONOHYDRATE 500 MG: 500 INJECTION, POWDER, LYOPHILIZED, FOR SOLUTION INTRAVENOUS at 16:59

## 2024-05-05 RX ADMIN — METOPROLOL TARTRATE 100 MG: 50 TABLET, FILM COATED ORAL at 20:46

## 2024-05-05 RX ADMIN — IPRATROPIUM BROMIDE AND ALBUTEROL SULFATE 1 DOSE: .5; 2.5 SOLUTION RESPIRATORY (INHALATION) at 21:39

## 2024-05-05 RX ADMIN — WATER 1000 MG: 1 INJECTION INTRAMUSCULAR; INTRAVENOUS; SUBCUTANEOUS at 17:01

## 2024-05-05 RX ADMIN — METHYLPREDNISOLONE SODIUM SUCCINATE 40 MG: 40 INJECTION INTRAMUSCULAR; INTRAVENOUS at 17:01

## 2024-05-05 ASSESSMENT — PAIN - FUNCTIONAL ASSESSMENT: PAIN_FUNCTIONAL_ASSESSMENT: NONE - DENIES PAIN

## 2024-05-05 ASSESSMENT — LIFESTYLE VARIABLES
HOW MANY STANDARD DRINKS CONTAINING ALCOHOL DO YOU HAVE ON A TYPICAL DAY: PATIENT DOES NOT DRINK
HOW OFTEN DO YOU HAVE A DRINK CONTAINING ALCOHOL: NEVER
HOW MANY STANDARD DRINKS CONTAINING ALCOHOL DO YOU HAVE ON A TYPICAL DAY: PATIENT DOES NOT DRINK

## 2024-05-05 ASSESSMENT — PAIN DESCRIPTION - PAIN TYPE: TYPE: ACUTE PAIN

## 2024-05-05 NOTE — PROGRESS NOTES
4 Eyes Skin Assessment     NAME:  Heather Keen  YOB: 1945  MEDICAL RECORD NUMBER:  [id number]    The patient is being assessed for  Admission    I agree that at least one RN has performed a thorough Head to Toe Skin Assessment on the patient. ALL assessment sites listed below have been assessed.      Areas assessed by both nurses:    Head, Face, Ears, Shoulders, Back, Chest, Arms, Elbows, Hands, Sacrum. Buttock, Coccyx, Ischium, Legs. Feet and Heels, and Under Medical Devices         Does the Patient have a Wound? No noted wound(s); bruising BUE       Ravinder Prevention initiated by RN: No  Wound Care Orders initiated by RN: No    Pressure Injury (Stage 3,4, Unstageable, DTI, NWPT, and Complex wounds) if present, place Wound referral order by RN under : No    New Ostomies, if present place, Ostomy referral order under : No     Nurse 1 eSignature: Electronically signed by Sindhu Russ RN on 5/5/24 at 6:10 PM EDT    **SHARE this note so that the co-signing nurse can place an eSignature**    Nurse 2 eSignature: Electronically signed by Erica Yeung RN on 5/5/24 at 6:50 PM EDT

## 2024-05-05 NOTE — ED PROVIDER NOTES
80 yo female presenting from home.  She has some shortness of breath since Thursday.  History of asthma, was placed on Frances cannula supplemental oxygen which she got here.  She was in the mid 80s and on 2 L is about 91% right now.  Is working to breathe, is in some respiratory distress, accessory muscle usage with tachypnea.  History of atrial fibrillation, on metoprolol and Xarelto for this         History reviewed. No pertinent family history.  Past Surgical History:   Procedure Laterality Date    APPENDECTOMY      BREAST SURGERY      rt benign    BREAST SURGERY      tumors left benign    CARDIOVERSION  2016 & nov 2020    x2    CHOLECYSTECTOMY      COLONOSCOPY      HYSTEROSCOPY  02/07/2014    hysteroscopy Dilatation and Curretage    JOINT REPLACEMENT  2002    rt and lt knees    OTHER SURGICAL HISTORY Left 05/07/2021    Left wrist open reduction internal fixation    THYROID SURGERY      biopsy & cyst removal benign    WRIST FRACTURE SURGERY Left 5/7/2021    LEFT WRIST OPEN REDUCTION INTERNAL FIXATION DISTAL RADIUS FRACTURE (ARTHREX) performed by Wolf Long DO at [facility] OR       Review of Systems   Constitutional:  Negative for chills and fever.   Respiratory:  Positive for chest tightness and shortness of breath.    Cardiovascular:  Negative for chest pain.   Gastrointestinal:  Negative for abdominal pain.        Physical Exam  Constitutional:       General: She is not in acute distress.     Appearance: She is well-developed.   HENT:      Head: Normocephalic and atraumatic.   Eyes:      Conjunctiva/sclera: Conjunctivae normal.      Pupils: Pupils are equal, round, and reactive to light.   Neck:      Thyroid: No thyromegaly.   Cardiovascular:      Rate and Rhythm: Normal rate and regular rhythm.   Pulmonary:      Effort: Tachypnea, accessory muscle usage and respiratory distress present.      Breath sounds: Decreased breath sounds and wheezing present.   Abdominal:      General: There is no    or   [] Signs of Organ Dysfunction:    - SBP < 90 or MAP < 65  - Altered mental status  - Creatinine > 2 or increased from      baseline  - Urine Output < 0.5 ml/kg/hr  - Bilirubin > 2  - INR > 1.5 (not anticoagulated)  - Platelets < 100,000  - Acute Respiratory Failure as     evidenced by new need for NIPPV     or mechanical ventilation        [x] No criteria met for Severe Sepsis.   Must meet 1:    [] Lactate > 4        or   [] SBP < 90 or MAP < 65 for at        least two readings in the first        hour after fluid bolus        administration      [] Vasopressors initiated (if hypotension persists after fluid resuscitation)                [] No criteria met for Septic Shock.   Patient Vitals for the past 6 hrs:   BP Temp Pulse SpO2 Weight Percent Weight Change   05/05/24 1040 -- 98 °F (36.7 °C) -- 90 % -- --   05/05/24 1051 -- -- 84 -- -- --   05/05/24 1103 (!) 166/79 -- (!) 115 -- 69.4 kg (153 lb) 0      Recent Labs     05/05/24  1128   WBC 7.5   CREATININE 0.8   BILITOT 0.5            ED Course as of 05/05/24 1433   Sun May 05, 2024   1432 Spoke with the patient again.  She reports that she was given an antibiotic from her family doctor several days ago.  We were able to find this and it was amoxicillin.  She says she has been taking it for 3 days. [SO]      ED Course User Index  [SO] Gorge Stevenson, DO       --------------------------------------------- PAST HISTORY ---------------------------------------------  Past Medical History:  has a past medical history of A-fib (HCC), Arthritis, Asthma, GERD (gastroesophageal reflux disease), Hypertension, Osteoporosis, and Thyroid disease.    Past Surgical History:  has a past surgical history that includes Cholecystectomy; Appendectomy; Breast surgery; Breast surgery; Thyroid surgery; joint replacement (2002); Colonoscopy; hysteroscopy (02/07/2014); Cardioversion (2016 & nov 2020); other surgical history (Left, 05/07/2021); and Wrist fracture surgery (Left,  7.1 6.4 - 8.3 g/dL    Albumin 4.0 3.5 - 5.2 g/dL    Total Bilirubin 0.5 0.0 - 1.2 mg/dL    Alkaline Phosphatase 75 35 - 104 U/L    ALT 26 0 - 32 U/L    AST 27 0 - 31 U/L   Lactate, Sepsis   Result Value Ref Range    Lactic Acid, Sepsis 1.4 0.5 - 1.9 mmol/L   Troponin   Result Value Ref Range    Troponin, High Sensitivity 13 (H) 0 - 9 ng/L   Brain Natriuretic Peptide   Result Value Ref Range    Pro-BNP 4,548 (H) 0 - 450 pg/mL   EKG 12 Lead   Result Value Ref Range    Ventricular Rate 108 BPM    Atrial Rate 115 BPM    QRS Duration 70 ms    Q-T Interval 276 ms    QTc Calculation (Bazett) 369 ms    R Axis -109 degrees    T Axis 78 degrees       RADIOLOGY:  XR CHEST PORTABLE   Final Result   Cardiomegaly with central congestion versus peribronchial inflammatory   process right greater than left without separate consolidation. Follow-up to   resolution is recommended.               ------------------------- NURSING NOTES AND VITALS REVIEWED ---------------------------  Date / Time Roomed:  5/5/2024 11:06 AM  ED Bed Assignment:  08/08    The nursing notes within the ED encounter and vital signs as below have been reviewed.     Patient Vitals for the past 24 hrs:   BP Temp Pulse SpO2 Weight   05/05/24 1103 (!) 166/79 -- (!) 115 -- 69.4 kg (153 lb)   05/05/24 1051 -- -- 84 -- --   05/05/24 1040 -- 98 °F (36.7 °C) -- 90 % --       Oxygen Saturation Interpretation: Abnormal and Improved after treatment    ------------------------------------------ PROGRESS NOTES ------------------------------------------  Re-evaluation(s):    Patient’s symptoms are improving  Repeat physical examination is improved    Counseling:  I have spoken with the patient and discussed today’s results, in addition to providing specific details for the plan of care and counseling regarding the diagnosis and prognosis.  Their questions are answered at this time and they are agreeable with the plan of admission.    ---------------------------------

## 2024-05-05 NOTE — H&P
[facility] Hospitalist Group   History and Physical      CHIEF COMPLAINT:  SOB    History of Present Illness:  79 y.o. female with a history of Atrial Fibrillation, Asthma, GERD, HTN, Osteoporosis, Thyroid Disease presents with increased SOB beginning on 5/1/24. Pt states she developed increased SOB on Wednesday. Pt states she has had a pretty busy week. Pt states on Monday she was gathering items for rummage sale. On Tuesday and Wednesday gathering quilts for sale. Thursday she was spring cleaning her porch and siding. States she began to note some SOB on Wed. She does not note any il contacts. States she has about 4 exacerbations in the year. She does not follow with Pulmonology, PCP manages Asthma. Upon presentation she was found hypoxic in the mid 80s on 2L NC. Noted increased WOB. She denies fevers, chills, N/V/D, CP. Pt received Duoneb and Solumedrol 60mg IV in ED course. Decision to admit pt for further evaluation and treatment.     Informant(s) for H&P: Pt and EMR    REVIEW OF SYSTEMS:  Admits to Increased SOB. Denies fevers, chills, cp, n/v, ha, vision/hearing changes, wt changes, hot/cold flashes, other open skin lesions, diarrhea, constipation, dysuria/hematuria unless noted in HPI. Complete ROS performed with the patient and is otherwise negative.      PMH:  Past Medical History:   Diagnosis Date    A-fib (HCC) 2016    no problems since last cardoversion    Arthritis     Asthma     GERD (gastroesophageal reflux disease)     Hypertension     Osteoporosis     Thyroid disease     goiters       Surgical History:  Past Surgical History:   Procedure Laterality Date    APPENDECTOMY      BREAST SURGERY      rt benign    BREAST SURGERY      tumors left benign    CARDIOVERSION  2016 & nov 2020    x2    CHOLECYSTECTOMY      COLONOSCOPY      HYSTEROSCOPY  02/07/2014    hysteroscopy Dilatation and Curretage    JOINT REPLACEMENT  2002    rt and lt knees    OTHER SURGICAL HISTORY Left 05/07/2021     Left wrist open reduction internal fixation    THYROID SURGERY      biopsy & cyst removal benign    WRIST FRACTURE SURGERY Left 5/7/2021    LEFT WRIST OPEN REDUCTION INTERNAL FIXATION DISTAL RADIUS FRACTURE (ARTHREX) performed by Wolf Long DO at [facility] OR       Medications Prior to Admission:    Prior to Admission medications    Medication Sig Start Date End Date Taking? Authorizing Provider   ampicillin (PRINCIPEN) 500 MG capsule Take 1 capsule by mouth 4 times daily   Yes Gerardo Sotelo MD   predniSONE 5 MG (48) TBPK Take by mouth   Yes Gerardo Sotelo MD   metoprolol tartrate (LOPRESSOR) 50 MG tablet Take 50 mg by mouth 2 times daily    Gerardo Sotelo MD   rivaroxaban (XARELTO) 20 MG TABS tablet Take 20 mg by mouth daily (with breakfast) Stopped 3 days pre op    Gerardo Sotelo MD   methIMAzole (TAPAZOLE) 5 MG tablet Take 5 mg by mouth every other day    Gerardo Sotelo MD   Albuterol Sulfate (PROAIR HFA IN) Inhale into the lungs as needed    Gerardo Sotelo MD   omeprazole (PRILOSEC) 40 MG delayed release capsule Take 40 mg by mouth daily    Gerardo Sotelo MD   ondansetron (ZOFRAN ODT) 4 MG disintegrating tablet Take 1 tablet by mouth every 8 hours as needed for Nausea or Vomiting May substitute for covered product 4/28/21   Gabino Dhaliwal DO   raloxifene (EVISTA) 60 MG tablet Take 60 mg by mouth daily.    Gerardo Sotelo MD   montelukast (SINGULAIR) 10 MG tablet Take 10 mg by mouth nightly.    Gerardo Sotelo MD   lisinopril (PRINIVIL;ZESTRIL) 10 MG tablet Take 10 mg by mouth daily.    Gerardo Sotelo MD   cetirizine (ZYRTEC) 10 MG tablet Take 10 mg by mouth daily.    Gerardo Sotelo MD   Multiple Vitamins-Minerals (OCUVITE PO) Take 1 capsule by mouth daily.    Gerardo Sotelo MD   Cyanocobalamin (VITAMIN B 12 PO) Take 1 tablet by mouth daily. 500mg    Gerardo Sotelo MD   Calcium-Magnesium-Vitamin D

## 2024-05-06 ENCOUNTER — APPOINTMENT (OUTPATIENT)
Age: 79
End: 2024-05-06
Attending: FAMILY MEDICINE
Payer: MEDICARE

## 2024-05-06 LAB
BASOPHILS # BLD: 0.08 K/UL (ref 0–0.2)
BASOPHILS NFR BLD: 1 % (ref 0–2)
CRP SERPL HS-MCNC: 8 MG/L (ref 0–5)
EKG ATRIAL RATE: 35 BPM
EKG P AXIS: -141 DEGREES
EKG Q-T INTERVAL: 84 MS
EKG QRS DURATION: 28 MS
EKG QTC CALCULATION (BAZETT): 158 MS
EKG R AXIS: 0 DEGREES
EKG T AXIS: -128 DEGREES
EKG VENTRICULAR RATE: 213 BPM
EOSINOPHIL # BLD: 0 K/UL (ref 0.05–0.5)
EOSINOPHILS RELATIVE PERCENT: 0 % (ref 0–6)
ERYTHROCYTE [DISTWIDTH] IN BLOOD BY AUTOMATED COUNT: 12.5 % (ref 11.5–15)
HCT VFR BLD AUTO: 46 % (ref 34–48)
HGB BLD-MCNC: 14.1 G/DL (ref 11.5–15.5)
LYMPHOCYTES NFR BLD: 0.38 K/UL (ref 1.5–4)
LYMPHOCYTES RELATIVE PERCENT: 4 % (ref 20–42)
MCH RBC QN AUTO: 27.9 PG (ref 26–35)
MCHC RBC AUTO-ENTMCNC: 30.7 G/DL (ref 32–34.5)
MCV RBC AUTO: 91.1 FL (ref 80–99.9)
MONOCYTES NFR BLD: 0.15 K/UL (ref 0.1–0.95)
MONOCYTES NFR BLD: 2 % (ref 2–12)
NEUTROPHILS NFR BLD: 93 % (ref 43–80)
NEUTS SEG NFR BLD: 8.09 K/UL (ref 1.8–7.3)
PLATELET # BLD AUTO: 267 K/UL (ref 130–450)
PMV BLD AUTO: 11.3 FL (ref 7–12)
PROCALCITONIN SERPL-MCNC: 0.07 NG/ML (ref 0–0.08)
RBC # BLD AUTO: 5.05 M/UL (ref 3.5–5.5)
RBC # BLD: NORMAL 10*6/UL
WBC OTHER # BLD: 8.7 K/UL (ref 4.5–11.5)

## 2024-05-06 PROCEDURE — 6370000000 HC RX 637 (ALT 250 FOR IP): Performed by: NURSE PRACTITIONER

## 2024-05-06 PROCEDURE — 85025 COMPLETE CBC W/AUTO DIFF WBC: CPT

## 2024-05-06 PROCEDURE — 2500000003 HC RX 250 WO HCPCS: Performed by: FAMILY MEDICINE

## 2024-05-06 PROCEDURE — 94640 AIRWAY INHALATION TREATMENT: CPT

## 2024-05-06 PROCEDURE — 6370000000 HC RX 637 (ALT 250 FOR IP): Performed by: FAMILY MEDICINE

## 2024-05-06 PROCEDURE — 2580000003 HC RX 258: Performed by: INTERNAL MEDICINE

## 2024-05-06 PROCEDURE — 2580000003 HC RX 258: Performed by: NURSE PRACTITIONER

## 2024-05-06 PROCEDURE — 6360000002 HC RX W HCPCS: Performed by: INTERNAL MEDICINE

## 2024-05-06 PROCEDURE — 2060000000 HC ICU INTERMEDIATE R&B

## 2024-05-06 PROCEDURE — 99232 SBSQ HOSP IP/OBS MODERATE 35: CPT | Performed by: INTERNAL MEDICINE

## 2024-05-06 PROCEDURE — 6360000002 HC RX W HCPCS: Performed by: NURSE PRACTITIONER

## 2024-05-06 PROCEDURE — 36415 COLL VENOUS BLD VENIPUNCTURE: CPT

## 2024-05-06 RX ADMIN — RIVAROXABAN 20 MG: 20 TABLET, FILM COATED ORAL at 08:14

## 2024-05-06 RX ADMIN — METOPROLOL TARTRATE 5 MG: 5 INJECTION INTRAVENOUS at 08:15

## 2024-05-06 RX ADMIN — CETIRIZINE HYDROCHLORIDE 10 MG: 10 TABLET, FILM COATED ORAL at 08:15

## 2024-05-06 RX ADMIN — METOPROLOL TARTRATE 100 MG: 50 TABLET, FILM COATED ORAL at 20:28

## 2024-05-06 RX ADMIN — METOPROLOL TARTRATE 5 MG: 5 INJECTION INTRAVENOUS at 17:04

## 2024-05-06 RX ADMIN — MONTELUKAST 10 MG: 10 TABLET, FILM COATED ORAL at 20:28

## 2024-05-06 RX ADMIN — Medication 10 ML: at 17:12

## 2024-05-06 RX ADMIN — LISINOPRIL 10 MG: 10 TABLET ORAL at 08:15

## 2024-05-06 RX ADMIN — IPRATROPIUM BROMIDE AND ALBUTEROL SULFATE 1 DOSE: .5; 2.5 SOLUTION RESPIRATORY (INHALATION) at 14:41

## 2024-05-06 RX ADMIN — METOPROLOL TARTRATE 50 MG: 50 TABLET, FILM COATED ORAL at 08:15

## 2024-05-06 RX ADMIN — METHYLPREDNISOLONE SODIUM SUCCINATE 40 MG: 40 INJECTION INTRAMUSCULAR; INTRAVENOUS at 04:37

## 2024-05-06 RX ADMIN — IPRATROPIUM BROMIDE AND ALBUTEROL SULFATE 1 DOSE: .5; 2.5 SOLUTION RESPIRATORY (INHALATION) at 06:33

## 2024-05-06 RX ADMIN — WATER 40 MG: 1 INJECTION INTRAMUSCULAR; INTRAVENOUS; SUBCUTANEOUS at 17:04

## 2024-05-06 RX ADMIN — IPRATROPIUM BROMIDE AND ALBUTEROL SULFATE 1 DOSE: .5; 2.5 SOLUTION RESPIRATORY (INHALATION) at 10:04

## 2024-05-06 RX ADMIN — Medication 10 ML: at 20:28

## 2024-05-06 RX ADMIN — IPRATROPIUM BROMIDE AND ALBUTEROL SULFATE 1 DOSE: .5; 2.5 SOLUTION RESPIRATORY (INHALATION) at 01:11

## 2024-05-06 ASSESSMENT — PAIN SCALES - GENERAL
PAINLEVEL_OUTOF10: 0
PAINLEVEL_OUTOF10: 0

## 2024-05-06 NOTE — PROGRESS NOTES
Admitting Date and Time: 5/5/2024 11:06 AM  Admit Dx: Atrial fibrillation with RVR ([facility]) [I48.91]  Acute asthma exacerbation [J45.901]  Acute respiratory failure with hypoxia ([facility]) [J96.01]    Subjective:    Some decrease in dyspnea on exertion.  FiO2 requirement also reduced  Per RN: No issue    ROS: denies fever, chills, cp, sob, n/v, HA unless stated above.     cetirizine  10 mg Oral Daily    lisinopril  10 mg Oral Daily    [Held by provider] methIMAzole  5 mg Oral Every Other Day    montelukast  10 mg Oral Nightly    rivaroxaban  20 mg Oral Daily with breakfast    sodium chloride flush  5-40 mL IntraVENous 2 times per day    methylPREDNISolone  40 mg IntraVENous Q8H    ipratropium 0.5 mg-albuterol 2.5 mg  1 Dose Inhalation Q4H WA RT    metoprolol tartrate  50 mg Oral QAM    metoprolol tartrate  100 mg Oral Nightly     sodium chloride flush, 5-40 mL, PRN  sodium chloride, , PRN  potassium chloride, 40 mEq, PRN   Or  potassium alternative oral replacement, 40 mEq, PRN   Or  potassium chloride, 10 mEq, PRN  magnesium sulfate, 2,000 mg, PRN  ondansetron, 4 mg, Q8H PRN   Or  ondansetron, 4 mg, Q6H PRN  polyethylene glycol, 17 g, Daily PRN  acetaminophen, 650 mg, Q6H PRN   Or  acetaminophen, 650 mg, Q6H PRN  metoprolol, 5 mg, Q6H PRN         Objective:    /88   Pulse (!) 136   Temp 98.1 °F (36.7 °C) (Oral)   Resp 22   Ht 1.651 m (5' 5\")   Wt 69.4 kg (153 lb)   SpO2 97%   BMI 25.46 kg/m²   General Appearance: alert and oriented to person, place and time and in no acute distress  Skin: warm and dry  Head: normocephalic and atraumatic  Eyes: pupils equal, round, and reactive to light, extraocular eye movements intact, conjunctivae normal  Neck: neck supple and non tender without mass   Pulmonary/Chest: Right greater than left lower wheeze, normal air movement, no respiratory distress  Cardiovascular: normal rate, normal S1 and S2 and no carotid bruits  Abdomen: soft, non-tender, non-distended, normal bowel  sounds, no masses or organomegaly  Extremities: no cyanosis, no clubbing and no  edema  Neurologic: no cranial nerve deficit and speech normal      Recent Labs     05/05/24  1128      K 4.3   CL 99   CO2 29   BUN 21   CREATININE 0.8   GLUCOSE 141*   CALCIUM 9.8       Recent Labs     05/05/24  1128   ALKPHOS 75   BILITOT 0.5   AST 27   ALT 26       Recent Labs     05/05/24  1128   WBC 7.5   RBC 4.93   HGB 13.9   HCT 44.0   MCV 89.2   MCH 28.2   MCHC 31.6*   RDW 12.5      MPV 10.8           Radiology:   XR CHEST PORTABLE   Final Result   Cardiomegaly with central congestion versus peribronchial inflammatory   process right greater than left without separate consolidation. Follow-up to   resolution is recommended.             Assessment:  Principal Problem:    Acute asthma exacerbation  Active Problems:    Atrial fibrillation with rapid ventricular response (HCC)    Hypothyroidism  Resolved Problems:    * No resolved hospital problems. *      Plan: History of present illness from History and Physical:  79 y.o. female with a history of Atrial Fibrillation, Asthma, GERD, HTN, Osteoporosis, Thyroid Disease presents with increased SOB beginning on 5/1/24. Pt states she developed increased SOB on Wednesday. Pt states she has had a pretty busy week. Pt states on Monday she was gathering items for rummage sale. On Tuesday and Wednesday gathering quilts for sale. Thursday she was spring cleaning her porch and siding. States she began to note some SOB on Wed. She does not note any il contacts. States she has about 4 exacerbations in the year. She does not follow with Pulmonology, PCP manages Asthma. Upon presentation she was found hypoxic in the mid 80s on 2L NC. Noted increased WOB. She denies fevers, chills, N/V/D, CP. Pt received Duoneb and Solumedrol 60mg IV in ED course. Decision to admit pt for further evaluation and treatment.     ED course Duoneb, Ceftriaxone 2000mg IV, Azithromycin 500g IV,  and Solumedrol

## 2024-05-06 NOTE — CONSULTS
Today's Date: 5/6/2024  Patient Name: Heather Keen  Date of admission: 5/5/2024 11:06 AM  Patient's age: 79 y.o., 1945female    Admission Dx: Atrial fibrillation with RVR (HCC) [I48.91]  Acute asthma exacerbation [J45.901]  Acute respiratory failure with hypoxia (HCC) [J96.01]    Requesting Physician: Debbi Burns DO    Chief Complaint   Patient presents with    Shortness of Breath     Hx asthma/ wheezing/ low pulse ox placed on 2lnc       HISTORY OF PRESENT ILLNESS:      79-year-old pleasant lady known to me, has history of paroxysmal atrial fibrillation, moderate mitral regurgitation, hypertension, chronically abnormal nuclear stress test showing fixed anterior wall defect without LV dysfunction, asthma, hypothyroidism, and other problems as below, presented with symptoms of coughing, dyspnea, and asthma exacerbation.  She tested positive for RSV.  She is currently and isolation.  Her EKG showed atrial fibrillation with rapid ventricular response.  She is on anticoagulation with Xarelto.  She is also on metoprolol 150 mg daily.  Troponin is minimally elevated.  ProBNP is above 4000.  She is denying any chest pain.        REVIEW OF SYSTEMS:    A comprehensive 14 point review of systems was negative except for what is in the HPI           Past Medical History:      Dyspnea with exertion    Chronic hypertension    Paroxysmal atrial fibrillation.     Asthma.     Arthritis.     Thyroid nodules.     Chronic hypertension.     Moderate mitral regurgitation.     Abnormal baseline EKG.     Strong family history of coronary disease of both parents.     Lexiscan nuclear stress test done in my office March 12, 2024 showed moderate size, moderately intense, fixed anterior wall defect with no reversible ischemia and ejection fraction of 67%.    10/05/20 Lexiscan nuclear stress test showed no ischemia and normal LV function ___ 10/05/20 2D echo Doppler showed normal LV function, moderate mitral regurgitation and mild  central congestion versus peribronchial inflammatory process right greater than left without separate consolidation. Follow-up to resolution is recommended.       IMPRESSION:      RSV bronchitis/pneumonia  Respiratory distress  Hypoxemia  Atrial fibrillation with rapid response  Acute diastolic CHF  Moderate mitral regurgitation  Chronic abnormal nuclear stress test showing fixed anterior defect with no LV dysfunction  Hypertension  Asthma  Minor elevation of troponin  Chronic abnormal EKG  Strong family history of premature coronary atherosclerosis of both parents    RECOMMENDATIONS:      Continue treatment for RSV infection/asthma exacerbation.  Bronchodilators  Will increase beta-blocker 200 mg by mouth twice a day for rate control  Continue anticoagulation with Xarelto.  She had recent noninvasive testing in my office.  Although her stress test showed fixed anterior defect, she was treated conservatively secondary to absence of angina or LV dysfunction.  She does have Q waves in leads V1 and V2 that are chronic.  She does have strong family history of chronic disease of both parents.  The patient may have disease of her LAD, however with her current RSV infection, respiratory problems, and absence of angina, I recommend conservative medical therapy at the present time.  Lasix 20 mg IV daily as long as creatinine is stable      Derrell Nuñez MD, MD, FACC

## 2024-05-07 ENCOUNTER — APPOINTMENT (OUTPATIENT)
Age: 79
End: 2024-05-07
Attending: FAMILY MEDICINE
Payer: MEDICARE

## 2024-05-07 PROBLEM — J45.909 UNCOMPLICATED ASTHMA: Status: ACTIVE | Noted: 2024-05-07

## 2024-05-07 PROBLEM — I15.9 SECONDARY HYPERTENSION: Status: ACTIVE | Noted: 2024-05-07

## 2024-05-07 LAB
ECHO AV CUSP MM: 1.1 CM
ECHO AV MEAN GRADIENT: 8 MMHG
ECHO AV MEAN VELOCITY: 1.4 M/S
ECHO AV PEAK GRADIENT: 14 MMHG
ECHO AV PEAK VELOCITY: 1.9 M/S
ECHO AV VTI: 34 CM
ECHO BSA: 1.78 M2
ECHO LA DIAMETER INDEX: 2.09 CM/M2
ECHO LA DIAMETER: 3.7 CM
ECHO LA VOL A-L A2C: 74 ML (ref 22–52)
ECHO LA VOL A-L A4C: 74 ML (ref 22–52)
ECHO LA VOL BP: 74 ML (ref 22–52)
ECHO LA VOL MOD A2C: 71 ML (ref 22–52)
ECHO LA VOL MOD A4C: 66 ML (ref 22–52)
ECHO LA VOL/BSA BIPLANE: 42 ML/M2 (ref 16–34)
ECHO LA VOLUME AREA LENGTH: 80 ML
ECHO LA VOLUME INDEX A-L A2C: 42 ML/M2 (ref 16–34)
ECHO LA VOLUME INDEX A-L A4C: 42 ML/M2 (ref 16–34)
ECHO LA VOLUME INDEX AREA LENGTH: 45 ML/M2 (ref 16–34)
ECHO LA VOLUME INDEX MOD A2C: 40 ML/M2 (ref 16–34)
ECHO LA VOLUME INDEX MOD A4C: 37 ML/M2 (ref 16–34)
ECHO LV FRACTIONAL SHORTENING: 29 % (ref 28–44)
ECHO LV INTERNAL DIMENSION DIASTOLE INDEX: 1.75 CM/M2
ECHO LV INTERNAL DIMENSION DIASTOLIC: 3.1 CM (ref 3.9–5.3)
ECHO LV INTERNAL DIMENSION SYSTOLIC INDEX: 1.24 CM/M2
ECHO LV INTERNAL DIMENSION SYSTOLIC: 2.2 CM
ECHO LV ISOVOLUMETRIC RELAXATION TIME (IVRT): 59.9 MS
ECHO LV IVSD: 1.1 CM (ref 0.6–0.9)
ECHO LV IVSS: 1.4 CM
ECHO LV MASS 2D: 99.7 G (ref 67–162)
ECHO LV MASS INDEX 2D: 56.3 G/M2 (ref 43–95)
ECHO LV POSTERIOR WALL DIASTOLIC: 1.1 CM (ref 0.6–0.9)
ECHO LV POSTERIOR WALL SYSTOLIC: 1.3 CM
ECHO LV RELATIVE WALL THICKNESS RATIO: 0.71
ECHO LVOT AREA: 2.3 CM2
ECHO LVOT DIAM: 1.7 CM
ECHO MV A VELOCITY: 0.59 M/S
ECHO MV AREA PHT: 2.9 CM2
ECHO MV E DECELERATION TIME (DT): 231.9 MS
ECHO MV E VELOCITY: 1.53 M/S
ECHO MV E/A RATIO: 2.59
ECHO MV MAX VELOCITY: 1.7 M/S
ECHO MV MEAN GRADIENT: 7 MMHG
ECHO MV MEAN VELOCITY: 1.3 M/S
ECHO MV PEAK GRADIENT: 12 MMHG
ECHO MV PRESSURE HALF TIME (PHT): 75.1 MS
ECHO MV VTI: 29.1 CM
ECHO PV MAX VELOCITY: 0.6 M/S
ECHO PV MEAN GRADIENT: 1 MMHG
ECHO PV MEAN VELOCITY: 0.4 M/S
ECHO PV PEAK GRADIENT: 1 MMHG
ECHO PV VTI: 7.9 CM
ECHO RV INTERNAL DIMENSION: 2.6 CM
ECHO RV LONGITUDINAL DIMENSION: 6.9 CM
ECHO RV MID DIMENSION: 2.6 CM
ECHO RVOT MEAN GRADIENT: 2 MMHG
ECHO RVOT PEAK GRADIENT: 3 MMHG
ECHO RVOT PEAK VELOCITY: 0.9 M/S
ECHO RVOT VTI: 14.2 CM
ECHO TV REGURGITANT MAX VELOCITY: 3.68 M/S
ECHO TV REGURGITANT PEAK GRADIENT: 54 MMHG

## 2024-05-07 PROCEDURE — 99232 SBSQ HOSP IP/OBS MODERATE 35: CPT | Performed by: INTERNAL MEDICINE

## 2024-05-07 PROCEDURE — 2580000003 HC RX 258: Performed by: NURSE PRACTITIONER

## 2024-05-07 PROCEDURE — 6360000002 HC RX W HCPCS: Performed by: INTERNAL MEDICINE

## 2024-05-07 PROCEDURE — 2580000003 HC RX 258: Performed by: INTERNAL MEDICINE

## 2024-05-07 PROCEDURE — 2060000000 HC ICU INTERMEDIATE R&B

## 2024-05-07 PROCEDURE — 94640 AIRWAY INHALATION TREATMENT: CPT

## 2024-05-07 PROCEDURE — 6370000000 HC RX 637 (ALT 250 FOR IP): Performed by: INTERNAL MEDICINE

## 2024-05-07 PROCEDURE — 93306 TTE W/DOPPLER COMPLETE: CPT

## 2024-05-07 PROCEDURE — 2700000000 HC OXYGEN THERAPY PER DAY

## 2024-05-07 PROCEDURE — 6370000000 HC RX 637 (ALT 250 FOR IP): Performed by: NURSE PRACTITIONER

## 2024-05-07 RX ORDER — METOPROLOL TARTRATE 50 MG/1
100 TABLET, FILM COATED ORAL 2 TIMES DAILY
Status: DISCONTINUED | OUTPATIENT
Start: 2024-05-07 | End: 2024-05-11 | Stop reason: HOSPADM

## 2024-05-07 RX ORDER — DILTIAZEM HYDROCHLORIDE 120 MG/1
120 CAPSULE, COATED, EXTENDED RELEASE ORAL DAILY
Status: DISCONTINUED | OUTPATIENT
Start: 2024-05-07 | End: 2024-05-08

## 2024-05-07 RX ORDER — FUROSEMIDE 10 MG/ML
20 INJECTION INTRAMUSCULAR; INTRAVENOUS ONCE
Status: COMPLETED | OUTPATIENT
Start: 2024-05-07 | End: 2024-05-07

## 2024-05-07 RX ADMIN — Medication 10 ML: at 15:46

## 2024-05-07 RX ADMIN — Medication 10 ML: at 20:13

## 2024-05-07 RX ADMIN — METOPROLOL TARTRATE 100 MG: 50 TABLET, FILM COATED ORAL at 09:22

## 2024-05-07 RX ADMIN — IPRATROPIUM BROMIDE AND ALBUTEROL SULFATE 1 DOSE: .5; 2.5 SOLUTION RESPIRATORY (INHALATION) at 12:18

## 2024-05-07 RX ADMIN — METOPROLOL TARTRATE 100 MG: 50 TABLET, FILM COATED ORAL at 20:13

## 2024-05-07 RX ADMIN — MONTELUKAST 10 MG: 10 TABLET, FILM COATED ORAL at 20:13

## 2024-05-07 RX ADMIN — WATER 40 MG: 1 INJECTION INTRAMUSCULAR; INTRAVENOUS; SUBCUTANEOUS at 15:46

## 2024-05-07 RX ADMIN — CETIRIZINE HYDROCHLORIDE 10 MG: 10 TABLET, FILM COATED ORAL at 09:22

## 2024-05-07 RX ADMIN — LISINOPRIL 10 MG: 10 TABLET ORAL at 09:22

## 2024-05-07 RX ADMIN — FUROSEMIDE 20 MG: 10 INJECTION, SOLUTION INTRAMUSCULAR; INTRAVENOUS at 04:58

## 2024-05-07 RX ADMIN — RIVAROXABAN 20 MG: 20 TABLET, FILM COATED ORAL at 09:22

## 2024-05-07 RX ADMIN — IPRATROPIUM BROMIDE AND ALBUTEROL SULFATE 1 DOSE: .5; 2.5 SOLUTION RESPIRATORY (INHALATION) at 16:28

## 2024-05-07 RX ADMIN — WATER 40 MG: 1 INJECTION INTRAMUSCULAR; INTRAVENOUS; SUBCUTANEOUS at 04:58

## 2024-05-07 NOTE — PLAN OF CARE
Problem: Discharge Planning  Goal: Discharge to home or other facility with appropriate resources  5/7/2024 0326 by Velma Tang RN  Outcome: Progressing  5/7/2024 0252 by Nina Colunga RN  Outcome: Progressing     Problem: ABCDS Injury Assessment  Goal: Absence of physical injury  5/7/2024 0326 by Velma Tang RN  Outcome: Progressing  5/7/2024 0252 by Nina Colunga RN  Outcome: Progressing     Problem: Safety - Adult  Goal: Free from fall injury  5/7/2024 0326 by Velma Tang RN  Outcome: Progressing  5/7/2024 0252 by Nina Colunga, RN  Outcome: Progressing

## 2024-05-07 NOTE — CARE COORDINATION
Case Management Assessment  Initial Evaluation    Date/Time of Evaluation: 5/7/2024 11:29 AM  Assessment Completed by: Dyan Morales RN    If patient is discharged prior to next notation, then this note serves as note for discharge by case management.    Patient Name: Heather eKen                   YOB: 1945  Diagnosis: Atrial fibrillation with RVR (HCC) [I48.91]  Acute asthma exacerbation [J45.901]  Acute respiratory failure with hypoxia (HCC) [J96.01]                   Date / Time: 5/5/2024 11:06 AM    Patient Admission Status: Inpatient   Readmission Risk (Low < 19, Mod (19-27), High > 27): Readmission Risk Score: 8.9    Current PCP: Skip Worthy MD  PCP verified by CM? Yes (Skip Worthy)    Chart Reviewed: Yes      History Provided by: Patient  Patient Orientation: Alert and Oriented    Patient Cognition: Alert    Hospitalization in the last 30 days (Readmission):  No    If yes, Readmission Assessment in CM Navigator will be completed.    Advance Directives:      Code Status: Full Code   Patient's Primary Decision Maker is:      Primary Decision Maker: Agueda Perez - Child - [phone]    Secondary Decision Maker: Brendon Keen - Child - [phone]    Supplemental (Other) Decision Maker: Jose Keen - Spouse - [phone]    Discharge Planning:    Patient lives with: Spouse/Significant Other Type of Home: House  Primary Care Giver:    Patient Support Systems include: Spouse/Significant Other, Children, Friends/Neighbors   Current Financial resources:    Current community resources:    Current services prior to admission: None            Current DME:              Type of Home Care services:  None    ADLS  Prior functional level: Independent in ADLs/IADLs  Current functional level: Independent in ADLs/IADLs        Family can provide assistance at DC: No  Would you like Case Management to discuss the discharge plan with any other family members/significant others, and if so, who? Yes (pts  signed by Dyan Morales RN on 5/7/2024 at 11:37 AM      Dyan Morales RN  Case Management Department

## 2024-05-07 NOTE — PROGRESS NOTES
Date:  5/7/2024  Patient: Heather Keen  Admission:  5/5/2024 11:06 AM  Admit DX: Atrial fibrillation with RVR ([facility]) [I48.91]  Acute asthma exacerbation [J45.901]  Acute respiratory failure with hypoxia (HCC) [J96.01]  Age:  79 y.o., 1945     LOS: 2 days       SUBJECTIVE:        Patient is seen and examined  She is reporting feeling much better  Less coughing.  No fever  Less dyspnea.        OBJECTIVE:    /75   Pulse (!) 119   Temp 97.8 °F (36.6 °C) (Oral)   Resp 18   Ht 1.651 m (5' 5\")   Wt 69.4 kg (153 lb)   SpO2 94%   BMI 25.46 kg/m²   No intake or output data in the 24 hours ending 05/07/24 0806    EXAM:     Head: Normocephalic, Normal hearing, Oral mucosa is moist.  Neck: Supple, No carotid bruit, No jugular venous distention, No lymphadenopathy.  Respiratory: Coarse lung sounds bilaterally, No Srinivasan wheezing, Respirations are non-labored, Breath sounds are equal, Symmetrical chest wall expansion.  Cardiovascular: Slightly tachycardic, variable S1, 1/6 systolic murmur precordial area and apex, No gallop, Good pulses equal in all extremities, No edema.  Gastrointestinal: Soft, Non-tender, Non-distended, Normal bowel sounds.  Musculoskeletal: Normal strength, No tenderness, No deformity.  Integumentary:  Warm, Dry.    Neurologic: Alert, Oriented, No focal deficits.  Cognition and Speech: Oriented, Speech clear and coherent.  Psychiatric: Cooperative, Appropriate mood & affect, Normal judgment.    Current Inpatient Medications:   metoprolol tartrate  100 mg Oral BID    methylPREDNISolone  40 mg IntraVENous Q12H    cetirizine  10 mg Oral Daily    lisinopril  10 mg Oral Daily    [Held by provider] methIMAzole  5 mg Oral Every Other Day    montelukast  10 mg Oral Nightly    rivaroxaban  20 mg Oral Daily with breakfast    sodium chloride flush  5-40 mL IntraVENous 2 times per day    ipratropium 0.5 mg-albuterol 2.5 mg  1 Dose Inhalation Q4H WA RT       IV Infusions (if any):   sodium chloride            Labs:   CBC:   Recent Labs     05/05/24  1128 05/06/24  0930   WBC 7.5 8.7   HGB 13.9 14.1   HCT 44.0 46.0    267     BMP:   Recent Labs     05/05/24  1128      K 4.3   CO2 29   BUN 21   CREATININE 0.8   LABGLOM 74   GLUCOSE 141*     BNP: No results for input(s): \"BNP\" in the last 72 hours.  PT/INR: No results for input(s): \"PROTIME\", \"INR\" in the last 72 hours.  APTT:No results for input(s): \"APTT\" in the last 72 hours.  CARDIAC ENZYMES:No results for input(s): \"CKTOTAL\", \"CKMB\", \"CKMBINDEX\", \"TROPONINI\" in the last 72 hours.  FASTING LIPID PANEL:  Lab Results   Component Value Date/Time    HDL 47 05/03/2021 03:25 PM    TRIG 80 05/03/2021 03:25 PM     LIVER PROFILE:  Recent Labs     05/05/24  1128   AST 27   ALT 26       ASSESSMENT:    RSV bronchitis/pneumonia  Respiratory distress  Hypoxemia  Atrial fibrillation with rapid response  Acute diastolic CHF  Moderate mitral regurgitation  Chronic abnormal nuclear stress test showing fixed anterior defect with no LV dysfunction  Hypertension  Asthma  Minor elevation of troponin  Chronic abnormal EKG  Strong family history of premature coronary atherosclerosis of both parents    PLAN:    Echocardiogram showed normal LV function  Patient is now telling me that she has been having pain in the upper shoulder areas and her neck and upper back after exertional activities.  This has been going on for several weeks.  I discussed again the results of her stress test with her.  She had fixed anterior wall defect on stress test recently and a few years ago.  The EKG is showing anteroseptal Q waves.  She will have Diagnostic coronary angiography rather than just conservative medical therapy therefore I will schedule diagnostic angiography for her prior to discharge and once her respiratory infectious status is less contagious.  No need to hold anticoagulation since she is in A. fib and the procedure will be done Transradial.  I will add Cardizem CD for better

## 2024-05-07 NOTE — PROGRESS NOTES
Attempted to wean oxygen this morning to none. Pulse ox to 77%. Reapplied oxygen at 2L and pulse ox to 95% and decreased to 1L and 93%. Reassessed pt now and pulse ox 93% on 1L. Decreased to 0.5L, will con't to monitor.

## 2024-05-07 NOTE — ACP (ADVANCE CARE PLANNING)
Advance Care Planning   Healthcare Decision Maker:    Primary Decision Maker: Agueda Perez - Child - [phone]    Secondary Decision Maker: Brendon Keen - Child - [phone]    Supplemental (Other) Decision Maker: Jose Keen - Spouse - [phone]

## 2024-05-08 PROCEDURE — 2580000003 HC RX 258: Performed by: NURSE PRACTITIONER

## 2024-05-08 PROCEDURE — 2700000000 HC OXYGEN THERAPY PER DAY

## 2024-05-08 PROCEDURE — 6370000000 HC RX 637 (ALT 250 FOR IP): Performed by: NURSE PRACTITIONER

## 2024-05-08 PROCEDURE — 6370000000 HC RX 637 (ALT 250 FOR IP): Performed by: INTERNAL MEDICINE

## 2024-05-08 PROCEDURE — 2500000003 HC RX 250 WO HCPCS: Performed by: FAMILY MEDICINE

## 2024-05-08 PROCEDURE — 99232 SBSQ HOSP IP/OBS MODERATE 35: CPT | Performed by: INTERNAL MEDICINE

## 2024-05-08 PROCEDURE — 94640 AIRWAY INHALATION TREATMENT: CPT

## 2024-05-08 PROCEDURE — 2580000003 HC RX 258: Performed by: INTERNAL MEDICINE

## 2024-05-08 PROCEDURE — 6360000002 HC RX W HCPCS: Performed by: INTERNAL MEDICINE

## 2024-05-08 PROCEDURE — 2060000000 HC ICU INTERMEDIATE R&B

## 2024-05-08 RX ORDER — DILTIAZEM HYDROCHLORIDE 180 MG/1
180 CAPSULE, COATED, EXTENDED RELEASE ORAL DAILY
Status: DISCONTINUED | OUTPATIENT
Start: 2024-05-09 | End: 2024-05-11 | Stop reason: HOSPADM

## 2024-05-08 RX ADMIN — METOPROLOL TARTRATE 100 MG: 50 TABLET, FILM COATED ORAL at 21:40

## 2024-05-08 RX ADMIN — METOPROLOL TARTRATE 100 MG: 50 TABLET, FILM COATED ORAL at 10:35

## 2024-05-08 RX ADMIN — LISINOPRIL 10 MG: 10 TABLET ORAL at 09:37

## 2024-05-08 RX ADMIN — METOPROLOL TARTRATE 5 MG: 5 INJECTION INTRAVENOUS at 09:38

## 2024-05-08 RX ADMIN — Medication 10 ML: at 10:40

## 2024-05-08 RX ADMIN — IPRATROPIUM BROMIDE AND ALBUTEROL SULFATE 1 DOSE: .5; 2.5 SOLUTION RESPIRATORY (INHALATION) at 10:25

## 2024-05-08 RX ADMIN — IPRATROPIUM BROMIDE AND ALBUTEROL SULFATE 1 DOSE: .5; 2.5 SOLUTION RESPIRATORY (INHALATION) at 13:57

## 2024-05-08 RX ADMIN — IPRATROPIUM BROMIDE AND ALBUTEROL SULFATE 1 DOSE: .5; 2.5 SOLUTION RESPIRATORY (INHALATION) at 06:45

## 2024-05-08 RX ADMIN — CETIRIZINE HYDROCHLORIDE 10 MG: 10 TABLET, FILM COATED ORAL at 09:37

## 2024-05-08 RX ADMIN — DILTIAZEM HYDROCHLORIDE 120 MG: 120 CAPSULE, COATED, EXTENDED RELEASE ORAL at 00:18

## 2024-05-08 RX ADMIN — WATER 40 MG: 1 INJECTION INTRAMUSCULAR; INTRAVENOUS; SUBCUTANEOUS at 05:20

## 2024-05-08 RX ADMIN — RIVAROXABAN 20 MG: 20 TABLET, FILM COATED ORAL at 09:37

## 2024-05-08 RX ADMIN — DILTIAZEM HYDROCHLORIDE 120 MG: 120 CAPSULE, COATED, EXTENDED RELEASE ORAL at 09:37

## 2024-05-08 RX ADMIN — MONTELUKAST 10 MG: 10 TABLET, FILM COATED ORAL at 21:40

## 2024-05-08 RX ADMIN — IPRATROPIUM BROMIDE AND ALBUTEROL SULFATE 1 DOSE: .5; 2.5 SOLUTION RESPIRATORY (INHALATION) at 16:29

## 2024-05-08 RX ADMIN — WATER 40 MG: 1 INJECTION INTRAMUSCULAR; INTRAVENOUS; SUBCUTANEOUS at 18:06

## 2024-05-08 RX ADMIN — Medication 10 ML: at 22:47

## 2024-05-08 NOTE — PROGRESS NOTES
Admitting Date and Time: 5/5/2024 11:06 AM  Admit Dx: Atrial fibrillation with RVR ([facility]) [I48.91]  Acute asthma exacerbation [J45.901]  Acute respiratory failure with hypoxia ([facility]) [J96.01]     Subjective:     Patient was quite weak but denied dyspnea when she was walking in the room to the bathroom and back again.  She does say that the small amount of activity wore her out  Per RN: No issue     ROS: denies fever, chills, cp, sob, n/v, HA unless stated above.     Scheduled Medications    cetirizine  10 mg Oral Daily    lisinopril  10 mg Oral Daily    [Held by provider] methIMAzole  5 mg Oral Every Other Day    montelukast  10 mg Oral Nightly    rivaroxaban  20 mg Oral Daily with breakfast    sodium chloride flush  5-40 mL IntraVENous 2 times per day    methylPREDNISolone  40 mg IntraVENous Q8H    ipratropium 0.5 mg-albuterol 2.5 mg  1 Dose Inhalation Q4H WA RT    metoprolol tartrate  50 mg Oral QAM    metoprolol tartrate  100 mg Oral Nightly           PRN Medications   sodium chloride flush, 5-40 mL, PRN  sodium chloride, , PRN  potassium chloride, 40 mEq, PRN   Or  potassium alternative oral replacement, 40 mEq, PRN   Or  potassium chloride, 10 mEq, PRN  magnesium sulfate, 2,000 mg, PRN  ondansetron, 4 mg, Q8H PRN   Or  ondansetron, 4 mg, Q6H PRN  polyethylene glycol, 17 g, Daily PRN  acetaminophen, 650 mg, Q6H PRN   Or  acetaminophen, 650 mg, Q6H PRN  metoprolol, 5 mg, Q6H PRN            Objective:     /88   Pulse (!) 136   Temp 98.1 °F (36.7 °C) (Oral)   Resp 22   Ht 1.651 m (5' 5\")   Wt 69.4 kg (153 lb)   SpO2 97%   BMI 25.46 kg/m²   General Appearance: alert and oriented to person, place and time and in no acute distress  Skin: warm and dry  Head: normocephalic and atraumatic  Eyes: pupils equal, round, and reactive to light, extraocular eye movements intact, conjunctivae normal  Neck: neck supple and non tender without mass   Pulmonary/Chest: Right lower wheeze, normal air movement, no

## 2024-05-08 NOTE — PROGRESS NOTES
Date:  5/8/2024  Patient: Heather Keen  Admission:  5/5/2024 11:06 AM  Admit DX: Atrial fibrillation with RVR ([facility]) [I48.91]  Acute asthma exacerbation [J45.901]  Acute respiratory failure with hypoxia ([facility]) [J96.01]  Age:  79 y.o., 1945     LOS: 3 days       SUBJECTIVE:      Patient is seen and examined  Is reporting much less coughing  No orthopnea  No chest pain      OBJECTIVE:    /79   Pulse 100   Temp 97.5 °F (36.4 °C) (Oral)   Resp 17   Ht 1.651 m (5' 5\")   Wt 69.4 kg (153 lb)   SpO2 95%   BMI 25.46 kg/m²   No intake or output data in the 24 hours ending 05/08/24 1655    EXAM:     Head: Normocephalic, Normal hearing, Oral mucosa is moist.  Neck: Supple, No carotid bruit, No jugular venous distention, No lymphadenopathy.  Respiratory: Coarse lung sounds bilaterally, No Srinivasan wheezing, Respirations are non-labored, Breath sounds are equal, Symmetrical chest wall expansion.  Cardiovascular: less tachycardic, variable S1, 1/6 systolic murmur precordial area and apex, No gallop, Good pulses equal in all extremities, No edema.  Gastrointestinal: Soft, Non-tender, Non-distended, Normal bowel sounds.  Musculoskeletal: Normal strength, No tenderness, No deformity.  Integumentary:  Warm, Dry.    Neurologic: Alert, Oriented, No focal deficits.  Cognition and Speech: Oriented, Speech clear and coherent.  Psychiatric: Cooperative, Appropriate mood & affect, Normal judgment.    Current Inpatient Medications:   [START ON 5/9/2024] dilTIAZem  180 mg Oral Daily    metoprolol tartrate  100 mg Oral BID    methylPREDNISolone  40 mg IntraVENous Q12H    cetirizine  10 mg Oral Daily    lisinopril  10 mg Oral Daily    [Held by provider] methIMAzole  5 mg Oral Every Other Day    montelukast  10 mg Oral Nightly    rivaroxaban  20 mg Oral Daily with breakfast    sodium chloride flush  5-40 mL IntraVENous 2 times per day    ipratropium 0.5 mg-albuterol 2.5 mg  1 Dose Inhalation Q4H WA RT       IV Infusions (if

## 2024-05-08 NOTE — PROGRESS NOTES
Admitting Date and Time: 5/5/2024 11:06 AM  Admit Dx: Atrial fibrillation with RVR ([facility]) [I48.91]  Acute asthma exacerbation [J45.901]  Acute respiratory failure with hypoxia ([facility]) [J96.01]    Subjective:    Coough is tighter.  FiO2 requirement increased  Per RN: No issue    ROS: denies fever, chills, cp, sob, n/v, HA unless stated above.     metoprolol tartrate  100 mg Oral BID    dilTIAZem  120 mg Oral Daily    methylPREDNISolone  40 mg IntraVENous Q12H    cetirizine  10 mg Oral Daily    lisinopril  10 mg Oral Daily    [Held by provider] methIMAzole  5 mg Oral Every Other Day    montelukast  10 mg Oral Nightly    rivaroxaban  20 mg Oral Daily with breakfast    sodium chloride flush  5-40 mL IntraVENous 2 times per day    ipratropium 0.5 mg-albuterol 2.5 mg  1 Dose Inhalation Q4H WA RT     sodium chloride flush, 5-40 mL, PRN  sodium chloride, , PRN  potassium chloride, 40 mEq, PRN   Or  potassium alternative oral replacement, 40 mEq, PRN   Or  potassium chloride, 10 mEq, PRN  magnesium sulfate, 2,000 mg, PRN  ondansetron, 4 mg, Q8H PRN   Or  ondansetron, 4 mg, Q6H PRN  polyethylene glycol, 17 g, Daily PRN  acetaminophen, 650 mg, Q6H PRN   Or  acetaminophen, 650 mg, Q6H PRN  metoprolol, 5 mg, Q6H PRN         Objective:    /66   Pulse (!) 128   Temp 97.3 °F (36.3 °C) (Axillary)   Resp 17   Ht 1.651 m (5' 5\")   Wt 69.4 kg (153 lb)   SpO2 96%   BMI 25.46 kg/m²   General Appearance: alert and oriented to person, place and time and in no acute distress  Skin: warm and dry  Head: normocephalic and atraumatic  Eyes: pupils equal, round, and reactive to light, extraocular eye movements intact, conjunctivae normal  Neck: neck supple and non tender without mass   Pulmonary/Chest: Right greater than left lower wheeze, normal air movement, no respiratory distress  Cardiovascular: normal rate, normal S1 and S2 and no carotid bruits  Abdomen: soft, non-tender, non-distended, normal bowel sounds, no masses or

## 2024-05-08 NOTE — PLAN OF CARE
Problem: Discharge Planning  Goal: Discharge to home or other facility with appropriate resources  Outcome: Progressing  Flowsheets (Taken 5/8/2024 0902)  Discharge to home or other facility with appropriate resources: Identify barriers to discharge with patient and caregiver     Problem: ABCDS Injury Assessment  Goal: Absence of physical injury  Outcome: Progressing     Problem: Safety - Adult  Goal: Free from fall injury  Outcome: Progressing

## 2024-05-09 LAB — ECHO BSA: 1.78 M2

## 2024-05-09 PROCEDURE — C1769 GUIDE WIRE: HCPCS | Performed by: INTERNAL MEDICINE

## 2024-05-09 PROCEDURE — 6360000004 HC RX CONTRAST MEDICATION: Performed by: INTERNAL MEDICINE

## 2024-05-09 PROCEDURE — 93458 L HRT ARTERY/VENTRICLE ANGIO: CPT | Performed by: INTERNAL MEDICINE

## 2024-05-09 PROCEDURE — 2580000003 HC RX 258: Performed by: INTERNAL MEDICINE

## 2024-05-09 PROCEDURE — 6370000000 HC RX 637 (ALT 250 FOR IP): Performed by: INTERNAL MEDICINE

## 2024-05-09 PROCEDURE — 4A023N7 MEASUREMENT OF CARDIAC SAMPLING AND PRESSURE, LEFT HEART, PERCUTANEOUS APPROACH: ICD-10-PCS | Performed by: INTERNAL MEDICINE

## 2024-05-09 PROCEDURE — 6370000000 HC RX 637 (ALT 250 FOR IP): Performed by: NURSE PRACTITIONER

## 2024-05-09 PROCEDURE — B2111ZZ FLUOROSCOPY OF MULTIPLE CORONARY ARTERIES USING LOW OSMOLAR CONTRAST: ICD-10-PCS | Performed by: INTERNAL MEDICINE

## 2024-05-09 PROCEDURE — C1894 INTRO/SHEATH, NON-LASER: HCPCS | Performed by: INTERNAL MEDICINE

## 2024-05-09 PROCEDURE — 2700000000 HC OXYGEN THERAPY PER DAY

## 2024-05-09 PROCEDURE — 2709999900 HC NON-CHARGEABLE SUPPLY: Performed by: INTERNAL MEDICINE

## 2024-05-09 PROCEDURE — C1887 CATHETER, GUIDING: HCPCS | Performed by: INTERNAL MEDICINE

## 2024-05-09 PROCEDURE — 2500000003 HC RX 250 WO HCPCS: Performed by: INTERNAL MEDICINE

## 2024-05-09 PROCEDURE — 99232 SBSQ HOSP IP/OBS MODERATE 35: CPT | Performed by: INTERNAL MEDICINE

## 2024-05-09 PROCEDURE — 94640 AIRWAY INHALATION TREATMENT: CPT

## 2024-05-09 PROCEDURE — 7100000000 HC PACU RECOVERY - FIRST 15 MIN: Performed by: INTERNAL MEDICINE

## 2024-05-09 PROCEDURE — 2060000000 HC ICU INTERMEDIATE R&B

## 2024-05-09 PROCEDURE — 2580000003 HC RX 258: Performed by: NURSE PRACTITIONER

## 2024-05-09 PROCEDURE — 6360000002 HC RX W HCPCS: Performed by: INTERNAL MEDICINE

## 2024-05-09 PROCEDURE — 7100000001 HC PACU RECOVERY - ADDTL 15 MIN: Performed by: INTERNAL MEDICINE

## 2024-05-09 PROCEDURE — B2151ZZ FLUOROSCOPY OF LEFT HEART USING LOW OSMOLAR CONTRAST: ICD-10-PCS | Performed by: INTERNAL MEDICINE

## 2024-05-09 RX ORDER — HEPARIN SODIUM 10000 [USP'U]/ML
INJECTION, SOLUTION INTRAVENOUS; SUBCUTANEOUS PRN
Status: DISCONTINUED | OUTPATIENT
Start: 2024-05-09 | End: 2024-05-09 | Stop reason: HOSPADM

## 2024-05-09 RX ORDER — NITROGLYCERIN 20 MG/100ML
INJECTION INTRAVENOUS CONTINUOUS PRN
Status: COMPLETED | OUTPATIENT
Start: 2024-05-09 | End: 2024-05-09

## 2024-05-09 RX ORDER — SODIUM CHLORIDE 0.9 % (FLUSH) 0.9 %
5-40 SYRINGE (ML) INJECTION EVERY 12 HOURS SCHEDULED
Status: DISCONTINUED | OUTPATIENT
Start: 2024-05-09 | End: 2024-05-11 | Stop reason: HOSPADM

## 2024-05-09 RX ORDER — SODIUM CHLORIDE 0.9 % (FLUSH) 0.9 %
5-40 SYRINGE (ML) INJECTION PRN
Status: DISCONTINUED | OUTPATIENT
Start: 2024-05-09 | End: 2024-05-11 | Stop reason: HOSPADM

## 2024-05-09 RX ORDER — MIDAZOLAM HYDROCHLORIDE 1 MG/ML
INJECTION INTRAMUSCULAR; INTRAVENOUS PRN
Status: DISCONTINUED | OUTPATIENT
Start: 2024-05-09 | End: 2024-05-09 | Stop reason: HOSPADM

## 2024-05-09 RX ORDER — VERAPAMIL HYDROCHLORIDE 2.5 MG/ML
INJECTION, SOLUTION INTRAVENOUS PRN
Status: DISCONTINUED | OUTPATIENT
Start: 2024-05-09 | End: 2024-05-09 | Stop reason: HOSPADM

## 2024-05-09 RX ORDER — SODIUM CHLORIDE 9 MG/ML
INJECTION, SOLUTION INTRAVENOUS ONCE
Status: COMPLETED | OUTPATIENT
Start: 2024-05-09 | End: 2024-05-09

## 2024-05-09 RX ORDER — SODIUM CHLORIDE 9 MG/ML
INJECTION, SOLUTION INTRAVENOUS PRN
Status: DISCONTINUED | OUTPATIENT
Start: 2024-05-09 | End: 2024-05-11 | Stop reason: HOSPADM

## 2024-05-09 RX ORDER — ACETAMINOPHEN 325 MG/1
650 TABLET ORAL EVERY 4 HOURS PRN
Status: DISCONTINUED | OUTPATIENT
Start: 2024-05-09 | End: 2024-05-11 | Stop reason: HOSPADM

## 2024-05-09 RX ORDER — SODIUM CHLORIDE 9 MG/ML
INJECTION, SOLUTION INTRAVENOUS CONTINUOUS
Status: DISCONTINUED | OUTPATIENT
Start: 2024-05-09 | End: 2024-05-11 | Stop reason: HOSPADM

## 2024-05-09 RX ADMIN — SODIUM CHLORIDE: 9 INJECTION, SOLUTION INTRAVENOUS at 16:30

## 2024-05-09 RX ADMIN — METOPROLOL TARTRATE 100 MG: 50 TABLET, FILM COATED ORAL at 09:57

## 2024-05-09 RX ADMIN — IPRATROPIUM BROMIDE AND ALBUTEROL SULFATE 1 DOSE: .5; 2.5 SOLUTION RESPIRATORY (INHALATION) at 19:18

## 2024-05-09 RX ADMIN — DILTIAZEM HYDROCHLORIDE 180 MG: 180 CAPSULE, COATED, EXTENDED RELEASE ORAL at 09:57

## 2024-05-09 RX ADMIN — Medication 10 ML: at 10:04

## 2024-05-09 RX ADMIN — METOPROLOL TARTRATE 100 MG: 50 TABLET, FILM COATED ORAL at 21:23

## 2024-05-09 RX ADMIN — WATER 40 MG: 1 INJECTION INTRAMUSCULAR; INTRAVENOUS; SUBCUTANEOUS at 05:12

## 2024-05-09 RX ADMIN — IPRATROPIUM BROMIDE AND ALBUTEROL SULFATE 1 DOSE: .5; 2.5 SOLUTION RESPIRATORY (INHALATION) at 06:11

## 2024-05-09 RX ADMIN — LISINOPRIL 10 MG: 10 TABLET ORAL at 09:58

## 2024-05-09 RX ADMIN — CETIRIZINE HYDROCHLORIDE 10 MG: 10 TABLET, FILM COATED ORAL at 09:57

## 2024-05-09 RX ADMIN — WATER 40 MG: 1 INJECTION INTRAMUSCULAR; INTRAVENOUS; SUBCUTANEOUS at 16:31

## 2024-05-09 RX ADMIN — MONTELUKAST 10 MG: 10 TABLET, FILM COATED ORAL at 21:23

## 2024-05-09 RX ADMIN — IPRATROPIUM BROMIDE AND ALBUTEROL SULFATE 1 DOSE: .5; 2.5 SOLUTION RESPIRATORY (INHALATION) at 09:26

## 2024-05-09 RX ADMIN — RIVAROXABAN 20 MG: 20 TABLET, FILM COATED ORAL at 09:58

## 2024-05-09 RX ADMIN — SODIUM CHLORIDE: 9 INJECTION, SOLUTION INTRAVENOUS at 13:06

## 2024-05-09 ASSESSMENT — PAIN SCALES - GENERAL
PAINLEVEL_OUTOF10: 0
PAINLEVEL_OUTOF10: 0

## 2024-05-09 NOTE — CATH APPROPRIATE USE CRITERIA
ACC-NCDR CathPCI V5 Collection Form      Indications and Presentation  - Indication(s) for cath lab visit: worsening angina and suspected CAD    - Angina type: typical.

## 2024-05-09 NOTE — PROGRESS NOTES
Date:  5/9/2024  Patient: Heather Keen  Admission:  5/5/2024 11:06 AM  Admit DX: Atrial fibrillation with RVR ([facility]) [I48.91]  Acute asthma exacerbation [J45.901]  Acute respiratory failure with hypoxia ([facility]) [J96.01]  Age:  79 y.o., 1945     LOS: 4 days       SUBJECTIVE:      Respiratory status improved significantly  No chest pain.  Minimal cough.  Afebrile.      OBJECTIVE:    BP (!) 139/95   Pulse 89   Temp 97 °F (36.1 °C) (Infrared)   Resp 20   Ht 1.651 m (5' 5\")   Wt 69.4 kg (153 lb)   SpO2 96%   BMI 25.46 kg/m²     Intake/Output Summary (Last 24 hours) at 5/9/2024 0804  Last data filed at 5/8/2024 2247  Gross per 24 hour   Intake 10 ml   Output --   Net 10 ml       EXAM:     Head: Normocephalic, Normal hearing, Oral mucosa is moist.  Neck: Supple, No carotid bruit, No jugular venous distention, No lymphadenopathy.  Respiratory: Coarse lung sounds bilaterally, No Srinivasan wheezing, Respirations are non-labored, Breath sounds are equal, Symmetrical chest wall expansion.  Cardiovascular: less tachycardic, variable S1, 1/6 systolic murmur precordial area and apex, No gallop, Good pulses equal in all extremities, No edema.  Gastrointestinal: Soft, Non-tender, Non-distended, Normal bowel sounds.  Musculoskeletal: Normal strength, No tenderness, No deformity.  Integumentary:  Warm, Dry.    Neurologic: Alert, Oriented, No focal deficits.  Cognition and Speech: Oriented, Speech clear and coherent.  Psychiatric: Cooperative, Appropriate mood & affect, Normal judgment.    Current Inpatient Medications:   dilTIAZem  180 mg Oral Daily    metoprolol tartrate  100 mg Oral BID    methylPREDNISolone  40 mg IntraVENous Q12H    cetirizine  10 mg Oral Daily    lisinopril  10 mg Oral Daily    [Held by provider] methIMAzole  5 mg Oral Every Other Day    montelukast  10 mg Oral Nightly    rivaroxaban  20 mg Oral Daily with breakfast    sodium chloride flush  5-40 mL IntraVENous 2 times per day    ipratropium 0.5  mg-albuterol 2.5 mg  1 Dose Inhalation Q4H WA RT       IV Infusions (if any):   sodium chloride      sodium chloride           Labs:   CBC:   Recent Labs     05/06/24  0930   WBC 8.7   HGB 14.1   HCT 46.0          FASTING LIPID PANEL:  Lab Results   Component Value Date/Time    HDL 47 05/03/2021 03:25 PM    TRIG 80 05/03/2021 03:25 PM     LIVER PROFILE:  No results for input(s): \"AST\", \"ALT\", \"LABALBU\" in the last 72 hours.      ASSESSMENT:    RSV bronchitis/pneumonia  Respiratory distress  Hypoxemia  Atrial fibrillation with rapid response  Acute diastolic CHF  Moderate mitral regurgitation  Chronic abnormal nuclear stress test showing fixed anterior defect with no LV dysfunction  Hypertension  Asthma  Minor elevation of troponin  Chronic abnormal EKG  Strong family history of premature coronary atherosclerosis of both parents  Exertional angina    PLAN:    Continue current management.  Cardiac catheterization today.  Okay to discharge if catheterization is negative.            Derrell Nuñez MD, MD, FACC

## 2024-05-09 NOTE — CARE COORDINATION
5/9/24 1333 CM note: CM note: (+) RSV 5/5/24, covid (-) 5/5/24, bl cx 5/5/24 ngtd. IV solumedrol. 3L nc; NO HOME O2. Hx afib- on xarelto, asthma. Plan for heart cath w/ Dr Nuñez today. Discharge plan is home and pt declines HHC or any other needs. Pt resides with her  in a ranch home, [address]. Her  is handicap and she is his caregiver. Pts daughter is currently here from [place] caring for him however she is unsure how long she can stay as she has 4 kids at home. Pt is concerned about how long she will need to be in the hospital. Discussed respite care at SNF as an option, and provided her with both SNF list and contact information for [facility]. Pt is independent with ADLs, drives, and has no DME for herself; however her  has DME that he uses. No hx HHC, hx both inpt & outpt rehab at [facility] after her miranda knee replacements. CM will watch for home O2 needs. Pts daughter or friend will provide transportation home. Electronically signed by Dyan Morales RN on 5/9/2024 at 1:48 PM

## 2024-05-09 NOTE — PROGRESS NOTES
Patient came down to special procedures for cardiac catheterization with Dr. Nuñez.       16 cc of air in right Radial band and Radial pulse is 2.  Right wrist to remain straight, patient not to bend wrist.  Syringe in the patients front gown pocket    Total amount of medication given during procedure: 1 mg of IV Versed     Patient transported back to the 5th floor.    nurse to nurse given to Maggie RN, nurse notified of above information.  Nurse assumed post  vital signs

## 2024-05-09 NOTE — PROGRESS NOTES
Admitting Date and Time: 5/5/2024 11:06 AM  Admit Dx: Atrial fibrillation with RVR ([facility]) [I48.91]  Acute asthma exacerbation [J45.901]  Acute respiratory failure with hypoxia ([facility]) [J96.01]    Subjective:    She is waiting for her cardiac catheterization as per cardiology .    Per RN: No issue    ROS: denies fever, chills, cp, sob, n/v, HA unless stated above.     dilTIAZem  180 mg Oral Daily    metoprolol tartrate  100 mg Oral BID    methylPREDNISolone  40 mg IntraVENous Q12H    cetirizine  10 mg Oral Daily    lisinopril  10 mg Oral Daily    [Held by provider] methIMAzole  5 mg Oral Every Other Day    montelukast  10 mg Oral Nightly    rivaroxaban  20 mg Oral Daily with breakfast    sodium chloride flush  5-40 mL IntraVENous 2 times per day    ipratropium 0.5 mg-albuterol 2.5 mg  1 Dose Inhalation Q4H WA RT     sodium chloride flush, 5-40 mL, PRN  sodium chloride, , PRN  potassium chloride, 40 mEq, PRN   Or  potassium alternative oral replacement, 40 mEq, PRN   Or  potassium chloride, 10 mEq, PRN  magnesium sulfate, 2,000 mg, PRN  ondansetron, 4 mg, Q8H PRN   Or  ondansetron, 4 mg, Q6H PRN  polyethylene glycol, 17 g, Daily PRN  acetaminophen, 650 mg, Q6H PRN   Or  acetaminophen, 650 mg, Q6H PRN  metoprolol, 5 mg, Q6H PRN         Objective:    BP (!) 142/72   Pulse 98   Temp 97.6 °F (36.4 °C) (Oral)   Resp 18   Ht 1.651 m (5' 5\")   Wt 69.4 kg (153 lb)   SpO2 95%   BMI 25.46 kg/m²   General Appearance: alert and oriented to person, place and time and in no acute distress  Skin: warm and dry  Head: normocephalic and atraumatic  Eyes: pupils equal, round, and reactive to light, extraocular eye movements intact, conjunctivae normal  Neck: neck supple and non tender without mass   Pulmonary/Chest: Right greater than left lower wheeze, normal air movement, no respiratory distress  Cardiovascular: normal rate, normal S1 and S2 and no carotid bruits  Abdomen: soft, non-tender, non-distended, normal bowel sounds, no  masses or organomegaly  Extremities: no cyanosis, no clubbing and no  edema  Neurologic: no cranial nerve deficit and speech normal      No results for input(s): \"NA\", \"K\", \"CL\", \"CO2\", \"BUN\", \"CREATININE\", \"GLUCOSE\", \"CALCIUM\" in the last 72 hours.      No results for input(s): \"ALKPHOS\", \"PROT\", \"LABALBU\", \"BILITOT\", \"AST\", \"ALT\" in the last 72 hours.      No results for input(s): \"WBC\", \"RBC\", \"HGB\", \"HCT\", \"MCV\", \"MCH\", \"MCHC\", \"RDW\", \"PLT\", \"MPV\" in the last 72 hours.          Radiology:   XR CHEST PORTABLE   Final Result   Cardiomegaly with central congestion versus peribronchial inflammatory   process right greater than left without separate consolidation. Follow-up to   resolution is recommended.             Assessment:  Principal Problem:    Acute asthma exacerbation  Active Problems:    Atrial fibrillation with rapid ventricular response (HCC)    Hypothyroidism    Uncomplicated asthma    Secondary hypertension  Resolved Problems:    * No resolved hospital problems. *      Plan: History of present illness from History and Physical:  79 y.o. female with a history of Atrial Fibrillation, Asthma, GERD, HTN, Osteoporosis, Thyroid Disease presents with increased SOB beginning on 5/1/24. Pt states she developed increased SOB on Wednesday. Pt states she has had a pretty busy week. Pt states on Monday she was gathering items for rummage sale. On Tuesday and Wednesday gathering quilts for sale. Thursday she was spring cleaning her porch and siding. States she began to note some SOB on Wed. She does not note any il contacts. States she has about 4 exacerbations in the year. She does not follow with Pulmonology, PCP manages Asthma. Upon presentation she was found hypoxic in the mid 80s on 2L NC. Noted increased WOB. She denies fevers, chills, N/V/D, CP. Pt received Duoneb and Solumedrol 60mg IV in ED course. Decision to admit pt for further evaluation and treatment.     ED course Duoneb, Ceftriaxone 2000mg IV,  Azithromycin 500g IV,  and Solumedrol 60mg IV    Asthma Exacerbation- CXR Cardiomegaly with central congestion versus peribronchial inflammatory process right greater than left without separate consolidation. Pro-Emanuel is normal. antibiotics not continued.  Solu-Medrol changed from 40 every 8 to every 12. Viral Panel positive for RSV, not  vaccinated.  See note from 5/6.  So far she is stable    Acute Respiratory Failure with Hypoxia- SPO2 80s on 2L NC upon presentation. O2 1L --> .5 --> 1--> 1.5 per NC. Does not wear oxygen supplementation at home. Continue to wean as tolerated. Maintain SPO2>92%. Flutter valve.  Tachycardia resolved  Atrial Fibrillation: Xarelto.  5/8 increase Cardizem cd from 120 to 180. Lopressor is max'ed out at 100 bid. Telemetry monitoring  2d echo:     Left Ventricle: Normal left ventricular systolic function with a visually estimated EF of 60 - 65%. Mildly increased wall thickness. Findings consistent with mild concentric hypertrophy.    Mitral Valve: Mild prolapse of the anterior mitral valve leaflet. Mild regurgitation.    Tricuspid Valve: Moderate regurgitation. Moderately elevated RVSP.    Left Atrium: Left atrium is mildly dilated.    Right Atrium: Right atrium is moderately dilated.    Pulmonary Arteries: Moderate pulmonary hypertension present.    Image quality is excellent.       GERD- PPI  HTN- Lopressor/Lisinopril. Follow adjust as needed  Thyroid Disease- Tapazole    Code Status: Full  DVT prophylaxis: Xarelto  Disposition further improvement needed before discharge     NOTE: This report was transcribed using voice recognition software. Every effort was made to ensure accuracy; however, inadvertent computerized transcription errors may be present.     Electronically signed by GUDELIA LANDAVERDE MD on 5/9/2024 at 11:02 AM

## 2024-05-09 NOTE — PLAN OF CARE
Problem: Discharge Planning  Goal: Discharge to home or other facility with appropriate resources  Outcome: Progressing  Flowsheets (Taken 5/9/2024 0829)  Discharge to home or other facility with appropriate resources: Identify barriers to discharge with patient and caregiver     Problem: ABCDS Injury Assessment  Goal: Absence of physical injury  Outcome: Progressing  Flowsheets (Taken 5/9/2024 0829)  Absence of Physical Injury: Implement safety measures based on patient assessment     Problem: Safety - Adult  Goal: Free from fall injury  Outcome: Progressing     Problem: Pain  Goal: Verbalizes/displays adequate comfort level or baseline comfort level  Outcome: Progressing

## 2024-05-10 LAB
ANION GAP SERPL CALCULATED.3IONS-SCNC: 6 MMOL/L (ref 7–16)
BUN SERPL-MCNC: 25 MG/DL (ref 6–23)
CALCIUM SERPL-MCNC: 9.7 MG/DL (ref 8.6–10.2)
CHLORIDE SERPL-SCNC: 100 MMOL/L (ref 98–107)
CHOLEST SERPL-MCNC: 169 MG/DL
CO2 SERPL-SCNC: 33 MMOL/L (ref 22–29)
CREAT SERPL-MCNC: 0.7 MG/DL (ref 0.5–1)
EKG ATRIAL RATE: 117 BPM
EKG Q-T INTERVAL: 320 MS
EKG QRS DURATION: 62 MS
EKG QTC CALCULATION (BAZETT): 389 MS
EKG R AXIS: 99 DEGREES
EKG T AXIS: 78 DEGREES
EKG VENTRICULAR RATE: 89 BPM
ERYTHROCYTE [DISTWIDTH] IN BLOOD BY AUTOMATED COUNT: 12.2 % (ref 11.5–15)
GFR, ESTIMATED: 88 ML/MIN/1.73M2
GLUCOSE SERPL-MCNC: 157 MG/DL (ref 74–99)
HCT VFR BLD AUTO: 45.7 % (ref 34–48)
HDLC SERPL-MCNC: 32 MG/DL
HGB BLD-MCNC: 14 G/DL (ref 11.5–15.5)
LDLC SERPL CALC-MCNC: 109 MG/DL
MCH RBC QN AUTO: 27.7 PG (ref 26–35)
MCHC RBC AUTO-ENTMCNC: 30.6 G/DL (ref 32–34.5)
MCV RBC AUTO: 90.3 FL (ref 80–99.9)
MICROORGANISM SPEC CULT: NORMAL
MICROORGANISM SPEC CULT: NORMAL
PLATELET # BLD AUTO: 313 K/UL (ref 130–450)
PMV BLD AUTO: 10.3 FL (ref 7–12)
POTASSIUM SERPL-SCNC: 4.8 MMOL/L (ref 3.5–5)
RBC # BLD AUTO: 5.06 M/UL (ref 3.5–5.5)
SERVICE CMNT-IMP: NORMAL
SERVICE CMNT-IMP: NORMAL
SODIUM SERPL-SCNC: 139 MMOL/L (ref 132–146)
SPECIMEN DESCRIPTION: NORMAL
SPECIMEN DESCRIPTION: NORMAL
TRIGL SERPL-MCNC: 142 MG/DL
VLDLC SERPL CALC-MCNC: 28 MG/DL
WBC OTHER # BLD: 12.9 K/UL (ref 4.5–11.5)

## 2024-05-10 PROCEDURE — 93005 ELECTROCARDIOGRAM TRACING: CPT | Performed by: INTERNAL MEDICINE

## 2024-05-10 PROCEDURE — 99232 SBSQ HOSP IP/OBS MODERATE 35: CPT | Performed by: INTERNAL MEDICINE

## 2024-05-10 PROCEDURE — 80048 BASIC METABOLIC PNL TOTAL CA: CPT

## 2024-05-10 PROCEDURE — 6370000000 HC RX 637 (ALT 250 FOR IP): Performed by: NURSE PRACTITIONER

## 2024-05-10 PROCEDURE — 80061 LIPID PANEL: CPT

## 2024-05-10 PROCEDURE — 2700000000 HC OXYGEN THERAPY PER DAY

## 2024-05-10 PROCEDURE — 2580000003 HC RX 258: Performed by: NURSE PRACTITIONER

## 2024-05-10 PROCEDURE — 2060000000 HC ICU INTERMEDIATE R&B

## 2024-05-10 PROCEDURE — 36415 COLL VENOUS BLD VENIPUNCTURE: CPT

## 2024-05-10 PROCEDURE — 2580000003 HC RX 258: Performed by: INTERNAL MEDICINE

## 2024-05-10 PROCEDURE — 6370000000 HC RX 637 (ALT 250 FOR IP): Performed by: INTERNAL MEDICINE

## 2024-05-10 PROCEDURE — 2500000003 HC RX 250 WO HCPCS: Performed by: FAMILY MEDICINE

## 2024-05-10 PROCEDURE — 6360000002 HC RX W HCPCS: Performed by: INTERNAL MEDICINE

## 2024-05-10 PROCEDURE — 94640 AIRWAY INHALATION TREATMENT: CPT

## 2024-05-10 PROCEDURE — 85027 COMPLETE CBC AUTOMATED: CPT

## 2024-05-10 RX ADMIN — DILTIAZEM HYDROCHLORIDE 180 MG: 180 CAPSULE, COATED, EXTENDED RELEASE ORAL at 08:31

## 2024-05-10 RX ADMIN — IPRATROPIUM BROMIDE AND ALBUTEROL SULFATE 1 DOSE: .5; 2.5 SOLUTION RESPIRATORY (INHALATION) at 06:38

## 2024-05-10 RX ADMIN — METOPROLOL TARTRATE 100 MG: 50 TABLET, FILM COATED ORAL at 20:30

## 2024-05-10 RX ADMIN — CETIRIZINE HYDROCHLORIDE 10 MG: 10 TABLET, FILM COATED ORAL at 08:31

## 2024-05-10 RX ADMIN — IPRATROPIUM BROMIDE AND ALBUTEROL SULFATE 1 DOSE: .5; 2.5 SOLUTION RESPIRATORY (INHALATION) at 09:49

## 2024-05-10 RX ADMIN — IPRATROPIUM BROMIDE AND ALBUTEROL SULFATE 1 DOSE: .5; 2.5 SOLUTION RESPIRATORY (INHALATION) at 13:29

## 2024-05-10 RX ADMIN — Medication 10 ML: at 20:31

## 2024-05-10 RX ADMIN — MONTELUKAST 10 MG: 10 TABLET, FILM COATED ORAL at 20:30

## 2024-05-10 RX ADMIN — RIVAROXABAN 20 MG: 20 TABLET, FILM COATED ORAL at 08:31

## 2024-05-10 RX ADMIN — WATER 40 MG: 1 INJECTION INTRAMUSCULAR; INTRAVENOUS; SUBCUTANEOUS at 16:37

## 2024-05-10 RX ADMIN — Medication 10 ML: at 01:37

## 2024-05-10 RX ADMIN — METOPROLOL TARTRATE 100 MG: 50 TABLET, FILM COATED ORAL at 08:34

## 2024-05-10 RX ADMIN — LISINOPRIL 10 MG: 10 TABLET ORAL at 08:31

## 2024-05-10 RX ADMIN — WATER 40 MG: 1 INJECTION INTRAMUSCULAR; INTRAVENOUS; SUBCUTANEOUS at 05:46

## 2024-05-10 RX ADMIN — IPRATROPIUM BROMIDE AND ALBUTEROL SULFATE 1 DOSE: .5; 2.5 SOLUTION RESPIRATORY (INHALATION) at 19:10

## 2024-05-10 NOTE — PROGRESS NOTES
Date:  5/10/2024  Patient: Heather Keen  Admission:  5/5/2024 11:06 AM  Admit DX: Atrial fibrillation with RVR ([facility]) [I48.91]  Acute asthma exacerbation [J45.901]  Acute respiratory failure with hypoxia ([facility]) [J96.01]  Age:  79 y.o., 1945     LOS: 5 days       SUBJECTIVE:      Respiratory status is improving  Denies chest pain  Catheterization showed patent coronaries with no significant disease      OBJECTIVE:    BP (!) 144/90   Pulse 75   Temp 97.7 °F (36.5 °C) (Infrared)   Resp 25   Ht 1.651 m (5' 5\")   Wt 69.4 kg (153 lb)   SpO2 97%   BMI 25.46 kg/m²     Intake/Output Summary (Last 24 hours) at 5/10/2024 0755  Last data filed at 5/9/2024 1426  Gross per 24 hour   Intake --   Output 10 ml   Net -10 ml       EXAM:     Head: Normocephalic, Normal hearing, Oral mucosa is moist.  Neck: Supple, No carotid bruit, No jugular venous distention, No lymphadenopathy.  Respiratory: Coarse lung sounds bilaterally, No Srinivasan wheezing, Respirations are non-labored, Breath sounds are equal, Symmetrical chest wall expansion.  Cardiovascular: less tachycardic, variable S1, 1/6 systolic murmur precordial area and apex, No gallop, Good pulses equal in all extremities, No edema.  Gastrointestinal: Soft, Non-tender, Non-distended, Normal bowel sounds.  Musculoskeletal: Normal strength, No tenderness, No deformity.  Integumentary:  Warm, Dry.    Neurologic: Alert, Oriented, No focal deficits.  Cognition and Speech: Oriented, Speech clear and coherent.  Psychiatric: Cooperative, Appropriate mood & affect, Normal judgment.    Current Inpatient Medications:   sodium chloride flush  5-40 mL IntraVENous 2 times per day    dilTIAZem  180 mg Oral Daily    metoprolol tartrate  100 mg Oral BID    methylPREDNISolone  40 mg IntraVENous Q12H    cetirizine  10 mg Oral Daily    lisinopril  10 mg Oral Daily    [Held by provider] methIMAzole  5 mg Oral Every Other Day    montelukast  10 mg Oral Nightly    rivaroxaban  20 mg Oral Daily  with breakfast    sodium chloride flush  5-40 mL IntraVENous 2 times per day    ipratropium 0.5 mg-albuterol 2.5 mg  1 Dose Inhalation Q4H WA RT       IV Infusions (if any):   sodium chloride 75 mL/hr at 05/09/24 1630    sodium chloride      sodium chloride           Labs:   CBC:   No results for input(s): \"WBC\", \"HGB\", \"HCT\", \"PLT\" in the last 72 hours.      FASTING LIPID PANEL:  Lab Results   Component Value Date/Time    HDL 47 05/03/2021 03:25 PM    TRIG 80 05/03/2021 03:25 PM     LIVER PROFILE:  No results for input(s): \"AST\", \"ALT\", \"LABALBU\" in the last 72 hours.      ASSESSMENT:    RSV bronchitis/pneumonia  Respiratory distress  Hypoxemia  Atrial fibrillation with rapid response  Acute diastolic CHF  Moderate mitral regurgitation  Chronic abnormal nuclear stress test showing fixed anterior defect with no LV dysfunction  Hypertension  Asthma  Minor elevation of troponin  Chronic abnormal EKG  Strong family history of premature coronary atherosclerosis of both parents  Exertional angina    PLAN:    Cardiac Catheterization was negative.  Continue treatment for RSV bronchitis/pneumonia  Can be discharged from cardiac standpoint 1 her respiratory status is stable enough for discharge            Derrell Nuñez MD, MD, FACC

## 2024-05-10 NOTE — PROGRESS NOTES
Comprehensive Nutrition Assessment    Type and Reason for Visit:  Initial, Positive Nutrition Screen (LOS)    Nutrition Recommendations/Plan:   Continue Current Diet  Consult RD as needed     Malnutrition Assessment:  Malnutrition Status:  Insufficient data (05/10/24 1240)    Context:  Acute Illness     Findings of the 6 clinical characteristics of malnutrition:  Energy Intake:  Unable to assess (d/t isolation status)  Weight Loss:  Unable to assess     Body Fat Loss:  Unable to assess     Muscle Mass Loss:  Unable to assess    Fluid Accumulation:  Unable to assess     Strength:  Not Performed    Nutrition Assessment:    Pt admit for acute asthma exacerbation 2/2 RSV, A-fib, respiratory failure with hypoxia. PMHx: Afib, ASthma, GERD, HTN, Thyroid Disease, Osteoporosis. Pt had abnormal stress test and cardiac catheterization is pending d/t being contagious. WILIAM po intake d/t isolation status, no intake recorded in nursing note. WILIAM weight gain or loss, last weight 2021. Will continue inpatient monitoring, f/up per policy.    Nutrition Related Findings:    A/O x4, I/O not recorded, lactic acid 2.7, CRP 8.0, Troponin 12, BNP 4,548, 3L O2 NC Wound Type: None       Current Nutrition Intake & Therapies:    Average Meal Intake: Unable to assess  Average Supplements Intake: None Ordered  ADULT DIET; Regular; Low Fat/Low Chol/High Fiber/2 gm Na    Anthropometric Measures:  Height: 165.1 cm (5' 5\")  Ideal Body Weight (IBW): 125 lbs (57 kg)    Admission Body Weight: 69.4 kg (153 lb)  Current Body Weight: 69.4 kg (153 lb), 122.4 % IBW. Weight Source: Stated (05/07/24)  Current BMI (kg/m2): 25.5  Usual Body Weight: 64.9 kg (143 lb 1.3 oz) (08/09/21)  % Weight Change (Calculated): 6.9  Weight Adjustment For: No Adjustment         BMI Categories: Normal Weight (BMI 18.5-24.9)    Estimated Daily Nutrient Needs:  Energy Requirements Based On: Kcal/kg  Weight Used for Energy Requirements: Current  Energy (kcal/day): 1400 - 1800

## 2024-05-10 NOTE — PROGRESS NOTES
Admitting Date and Time: 5/5/2024 11:06 AM  Admit Dx: Atrial fibrillation with RVR ([facility]) [I48.91]  Acute asthma exacerbation [J45.901]  Acute respiratory failure with hypoxia ([facility]) [J96.01]    Subjective:    Episode of anxiety the and shortness of breath not amenable to O2 yesterday and routes to cardiac catheterization.  Currently she is without complaints   Per RN: No issue    ROS: denies fever, chills, cp, sob, n/v, HA unless stated above.     sodium chloride flush  5-40 mL IntraVENous 2 times per day    dilTIAZem  180 mg Oral Daily    metoprolol tartrate  100 mg Oral BID    methylPREDNISolone  40 mg IntraVENous Q12H    cetirizine  10 mg Oral Daily    lisinopril  10 mg Oral Daily    [Held by provider] methIMAzole  5 mg Oral Every Other Day    montelukast  10 mg Oral Nightly    rivaroxaban  20 mg Oral Daily with breakfast    sodium chloride flush  5-40 mL IntraVENous 2 times per day    ipratropium 0.5 mg-albuterol 2.5 mg  1 Dose Inhalation Q4H WA RT     sodium chloride flush, 5-40 mL, PRN  sodium chloride, , PRN  acetaminophen, 650 mg, Q4H PRN  sodium chloride flush, 5-40 mL, PRN  sodium chloride, , PRN  potassium chloride, 40 mEq, PRN   Or  potassium alternative oral replacement, 40 mEq, PRN   Or  potassium chloride, 10 mEq, PRN  magnesium sulfate, 2,000 mg, PRN  ondansetron, 4 mg, Q8H PRN   Or  ondansetron, 4 mg, Q6H PRN  polyethylene glycol, 17 g, Daily PRN  acetaminophen, 650 mg, Q6H PRN   Or  acetaminophen, 650 mg, Q6H PRN  metoprolol, 5 mg, Q6H PRN         Objective:    BP (!) 152/70   Pulse 90   Temp 97.7 °F (36.5 °C) (Oral)   Resp 20   Ht 1.651 m (5' 5\")   Wt 69.4 kg (153 lb)   SpO2 96%   BMI 25.46 kg/m²   General Appearance: alert and oriented to person, place and time and in no acute distress  Skin: warm and dry  Head: normocephalic and atraumatic  Eyes: pupils equal, round, and reactive to light, extraocular eye movements intact, conjunctivae normal  Neck: neck supple and non tender without mass    Pulmonary/Chest: Right greater than left lower wheeze, normal air movement, no respiratory distress  Cardiovascular: normal rate, normal S1 and S2 and no carotid bruits  Abdomen: soft, non-tender, non-distended, normal bowel sounds, no masses or organomegaly  Extremities: no cyanosis, no clubbing and no  edema  Neurologic: no cranial nerve deficit and speech normal      Recent Labs     05/10/24  0842      K 4.8      CO2 33*   BUN 25*   CREATININE 0.7   GLUCOSE 157*   CALCIUM 9.7         No results for input(s): \"ALKPHOS\", \"PROT\", \"LABALBU\", \"BILITOT\", \"AST\", \"ALT\" in the last 72 hours.      Recent Labs     05/10/24  0842   WBC 12.9*   RBC 5.06   HGB 14.0   HCT 45.7   MCV 90.3   MCH 27.7   MCHC 30.6*   RDW 12.2      MPV 10.3             Radiology:   XR CHEST PORTABLE   Final Result   Cardiomegaly with central congestion versus peribronchial inflammatory   process right greater than left without separate consolidation. Follow-up to   resolution is recommended.             Assessment:  Principal Problem:    Acute asthma exacerbation  Active Problems:    Atrial fibrillation with rapid ventricular response (HCC)    Hypothyroidism    Uncomplicated asthma    Secondary hypertension  Resolved Problems:    * No resolved hospital problems. *      Plan: History of present illness from History and Physical:  79 y.o. female with a history of Atrial Fibrillation, Asthma, GERD, HTN, Osteoporosis, Thyroid Disease presents with increased SOB beginning on 5/1/24. Pt states she developed increased SOB on Wednesday. Pt states she has had a pretty busy week. Pt states on Monday she was gathering items for rummage sale. On Tuesday and Wednesday gathering quilts for sale. Thursday she was spring cleaning her porch and siding. States she began to note some SOB on Wed. She does not note any il contacts. States she has about 4 exacerbations in the year. She does not follow with Pulmonology, PCP manages Asthma. Upon  presentation she was found hypoxic in the mid 80s on 2L NC. Noted increased WOB. She denies fevers, chills, N/V/D, CP. Pt received Duoneb and Solumedrol 60mg IV in ED course. Decision to admit pt for further evaluation and treatment.     ED course Duoneb, Ceftriaxone 2000mg IV, Azithromycin 500g IV,  and Solumedrol 60mg IV    Asthma Exacerbation- CXR Cardiomegaly with central congestion versus peribronchial inflammatory process right greater than left without separate consolidation. Pro-Emanuel is normal. antibiotics not continued.  Solu-Medrol changed from 40 every 8 to every 12. Viral Panel positive for RSV, not  vaccinated.  See note from 5/6.  So far she is stable    Acute Respiratory Failure with Hypoxia- SPO2 80s on 2L NC upon presentation. O2 1L --> .5 --> 1--> 1.5 per NC. Does not wear oxygen supplementation at home. Continue to wean as tolerated. Maintain SPO2>92%. Flutter valve.  Tachycardia resolved.  Oxygen need is fluctuating was able to be decreased but she has not yet walked in the hallway as of 5/10 just in the early afternoon.  Atrial Fibrillation: Xarelto.  5/8 increase Cardizem cd from 120 to 180. Lopressor is max'ed out at 100 bid. Telemetry monitoring  2d echo:     Left Ventricle: Normal left ventricular systolic function with a visually estimated EF of 60 - 65%. Mildly increased wall thickness. Findings consistent with mild concentric hypertrophy.    Mitral Valve: Mild prolapse of the anterior mitral valve leaflet. Mild regurgitation.    Tricuspid Valve: Moderate regurgitation. Moderately elevated RVSP.    Left Atrium: Left atrium is mildly dilated.    Right Atrium: Right atrium is moderately dilated.    Pulmonary Arteries: Moderate pulmonary hypertension present.    Image quality is excellent.       GERD- PPI  HTN- Lopressor/Lisinopril. Follow adjust as needed  Thyroid Disease- Tapazole    Code Status: Full  DVT prophylaxis: Xarelto  Disposition further improvement needed before discharge

## 2024-05-10 NOTE — PROGRESS NOTES
Pulse oximetry assessment   92% at rest on room air (if 88% or less, skip next steps)  85% while ambulating on room air  94% at rest on 1LPM  94% while ambulating on 1LPM

## 2024-05-10 NOTE — CARE COORDINATION
5/10/2024 333p (sf) SS NOTE- Walk test completed and pt qualifies for home 02 @ 2L. SW met with pt to get DME choices. Pt denies DME preference. Referral made to Abel @ [facility]. Orders obtained. Abel to bring port tank to room on this date and will bring rest of dme to pt's home upon dc. Family will provide transport home.     Electronically signed by LINDA Cummings on 5/10/2024 at 3:38 PM

## 2024-05-11 VITALS
TEMPERATURE: 97.4 F | OXYGEN SATURATION: 94 % | RESPIRATION RATE: 20 BRPM | DIASTOLIC BLOOD PRESSURE: 83 MMHG | SYSTOLIC BLOOD PRESSURE: 122 MMHG | WEIGHT: 153 LBS | HEART RATE: 121 BPM | BODY MASS INDEX: 25.49 KG/M2 | HEIGHT: 65 IN

## 2024-05-11 PROCEDURE — 6360000002 HC RX W HCPCS: Performed by: INTERNAL MEDICINE

## 2024-05-11 PROCEDURE — 6370000000 HC RX 637 (ALT 250 FOR IP): Performed by: INTERNAL MEDICINE

## 2024-05-11 PROCEDURE — 2580000003 HC RX 258: Performed by: INTERNAL MEDICINE

## 2024-05-11 PROCEDURE — 2700000000 HC OXYGEN THERAPY PER DAY

## 2024-05-11 PROCEDURE — 6370000000 HC RX 637 (ALT 250 FOR IP): Performed by: NURSE PRACTITIONER

## 2024-05-11 PROCEDURE — 94669 MECHANICAL CHEST WALL OSCILL: CPT

## 2024-05-11 PROCEDURE — 99239 HOSP IP/OBS DSCHRG MGMT >30: CPT | Performed by: INTERNAL MEDICINE

## 2024-05-11 PROCEDURE — 94640 AIRWAY INHALATION TREATMENT: CPT

## 2024-05-11 RX ORDER — IPRATROPIUM BROMIDE AND ALBUTEROL SULFATE 2.5; .5 MG/3ML; MG/3ML
3 SOLUTION RESPIRATORY (INHALATION)
Qty: 360 ML | Refills: 0 | Status: SHIPPED | OUTPATIENT
Start: 2024-05-11 | End: 2024-05-11 | Stop reason: HOSPADM

## 2024-05-11 RX ORDER — PREDNISONE 10 MG/1
TABLET ORAL
Qty: 42 TABLET | Refills: 0 | Status: SHIPPED | OUTPATIENT
Start: 2024-05-11

## 2024-05-11 RX ORDER — DILTIAZEM HYDROCHLORIDE 180 MG/1
180 CAPSULE, COATED, EXTENDED RELEASE ORAL DAILY
Qty: 30 CAPSULE | Refills: 0 | Status: SHIPPED | OUTPATIENT
Start: 2024-05-12

## 2024-05-11 RX ADMIN — IPRATROPIUM BROMIDE AND ALBUTEROL SULFATE 1 DOSE: .5; 2.5 SOLUTION RESPIRATORY (INHALATION) at 06:27

## 2024-05-11 RX ADMIN — WATER 40 MG: 1 INJECTION INTRAMUSCULAR; INTRAVENOUS; SUBCUTANEOUS at 05:17

## 2024-05-11 RX ADMIN — METOPROLOL TARTRATE 100 MG: 50 TABLET, FILM COATED ORAL at 09:25

## 2024-05-11 RX ADMIN — DILTIAZEM HYDROCHLORIDE 180 MG: 180 CAPSULE, COATED, EXTENDED RELEASE ORAL at 09:25

## 2024-05-11 RX ADMIN — CETIRIZINE HYDROCHLORIDE 10 MG: 10 TABLET, FILM COATED ORAL at 09:25

## 2024-05-11 RX ADMIN — RIVAROXABAN 20 MG: 20 TABLET, FILM COATED ORAL at 09:25

## 2024-05-11 RX ADMIN — LISINOPRIL 10 MG: 10 TABLET ORAL at 09:25

## 2024-05-11 RX ADMIN — IPRATROPIUM BROMIDE AND ALBUTEROL SULFATE 1 DOSE: .5; 2.5 SOLUTION RESPIRATORY (INHALATION) at 09:32

## 2024-05-11 NOTE — CARE COORDINATION
Ss note:5/11/2024 .11:17 AM Discharge order noted. Portable tank delivered to room from [facility] yesterday. Follow up visit to room pt aware to call [facility] upon arrival home for set up of concentrator, number is on portable tank, pt voiced understanding.[facility] liaison aware of discharge order.  Family to transport home. LINDA Gonzales

## 2024-05-11 NOTE — DISCHARGE SUMMARY
[facility] Hospitalist       Hospitalist Physician Discharge Summary       No follow-up provider specified.    Activity level: Slowly increase as tolerated    Diet: ADULT DIET; Regular; Low Fat/Low Chol/High Fiber/2 gm Na    Labs: None are pending at the discharge    Condition at discharge: Stable    Dispo: Return to home setting     Patient ID:  Heather Keen  [id number]  79 y.o.  1945    Admit date: 5/5/2024    Discharge date and time:  5/11/2024  10:53 AM    Admission Diagnoses: Principal Problem:    Acute asthma exacerbation  Active Problems:    Atrial fibrillation with rapid ventricular response (HCC)    Hypothyroidism    Uncomplicated asthma    Secondary hypertension  Resolved Problems:    * No resolved hospital problems. *      Discharge Diagnoses: Principal Problem:    Acute asthma exacerbation  Active Problems:    Atrial fibrillation with rapid ventricular response (HCC)    Hypothyroidism    Uncomplicated asthma    Secondary hypertension  Resolved Problems:    * No resolved hospital problems. *      Consults:  IP CONSULT TO CARDIOLOGY    Procedures: None significant except if described in hospital course.    Hospital Course: History of present illness from History and Physical:  79 y.o. female with a history of Atrial Fibrillation, Asthma, GERD, HTN, Osteoporosis, Thyroid Disease presents with increased SOB beginning on 5/1/24. Pt states she developed increased SOB on Wednesday. Pt states she has had a pretty busy week. Pt states on Monday she was gathering items for rummage sale. On Tuesday and Wednesday gathering quilts for sale. Thursday she was spring cleaning her porch and siding. States she began to note some SOB on Wed. She does not note any il contacts. States she has about 4 exacerbations in the year. She does not follow with Pulmonology, PCP manages Asthma. Upon presentation she was found hypoxic in the mid 80s on 2L NC. Noted increased WOB. She denies fevers, chills, N/V/D, CP.

## (undated) DEVICE — 3M™ STERI-STRIP™ REINFORCED ADHESIVE SKIN CLOSURES, R1541, 1/4 IN X 3 IN (6 MM X 75 MM), 3 STRIPS/ENVELOPE: Brand: 3M™ STERI-STRIP™

## (undated) DEVICE — TAPE CAST W4INXL4YD WHT FBRGLS POLYUR RESIN LO TACK RIG

## (undated) DEVICE — INTENDED FOR TISSUE SEPARATION, AND OTHER PROCEDURES THAT REQUIRE A SHARP SURGICAL BLADE TO PUNCTURE OR CUT.: Brand: BARD-PARKER ® STAINLESS STEEL BLADES

## (undated) DEVICE — BIT DRL DIA2.5MM GRAD FOR 3.5MM LOK CORT SCR

## (undated) DEVICE — GOWN SURG XL SMS FAB NONREINFORCED RAGLAN SLV HK LOOP CLSR

## (undated) DEVICE — HOOK LOCK LATEX FREE ELASTIC BANDAGE 2INX5YD

## (undated) DEVICE — STANDARD HYPODERMIC NEEDLE,POLYPROPYLENE HUB: Brand: MONOJECT

## (undated) DEVICE — HEARTRAIL III GUIDING CATHETER: Brand: HEARTRAIL

## (undated) DEVICE — GLOVE SURG SZ 8 L12IN FNGR THK94MIL STD WHT LTX FREE

## (undated) DEVICE — SLING ARM 9 1/2X20IN L MULTIPAK

## (undated) DEVICE — GLIDESHEATH SLENDER STAINLESS STEEL KIT: Brand: GLIDESHEATH SLENDER

## (undated) DEVICE — GAUZE,SPONGE,4"X4",16PLY,XRAY,STRL,LF: Brand: MEDLINE

## (undated) DEVICE — KIT ANGIO W/ AT P65 PREM HND CTRL FOR CNTRST DEL ANGIOTOUCH

## (undated) DEVICE — PACK PROC ORTH LO EXT IX CUST

## (undated) DEVICE — PACK SURG CARDIAC CATH

## (undated) DEVICE — DRAPE 64X41IN RADIOLOGY C ARM EQUIP STER

## (undated) DEVICE — TAPE CAST W3INXL4YD WHT FBRGLS POLYUR RESIN LO TACK RIG

## (undated) DEVICE — PAD, DEFIB, ADULT, RADIOTRAN, PHYSIO, LO: Brand: MEDLINE

## (undated) DEVICE — 20 ML SYRINGE REGULAR TIP: Brand: MONOJECT

## (undated) DEVICE — 1810 FOAM BLOCK NEEDLE COUNTER: Brand: DEVON

## (undated) DEVICE — GUIDEWIRE VASC L260CM DIA0.035IN TIP L3MM STD EXCHG PTFE J

## (undated) DEVICE — ELECTRODE NDL 2.8IN COAT VALLEYLAB

## (undated) DEVICE — PEN: MARKING STD 100/CS: Brand: MEDICAL ACTION INDUSTRIES

## (undated) DEVICE — 3M™ COBAN™ SELF-ADHERENT WRAP, 1586S, STERILE, 6 IN X 5 YD (15 CM X 4,5 M), 12 ROLLS/CASE: Brand: 3M™ COBAN™

## (undated) DEVICE — PADDING CAST 4 YDX3 IN COTTON NS WBRL

## (undated) DEVICE — HANDLE CVR PATENTED RETENTION DISC STRL LIGHT SHLD

## (undated) DEVICE — CHLORAPREP 26ML ORANGE

## (undated) DEVICE — GUIDEWIRE ORTH L150MM DIA0.053IN W/ TRCR TIP FOR ANK FRAC

## (undated) DEVICE — DOUBLE BASIN SET: Brand: MEDLINE INDUSTRIES, INC.

## (undated) DEVICE — GAUZE,SPONGE,4"X4",16PLY,STRL,LF,10/TRAY: Brand: MEDLINE

## (undated) DEVICE — TOWEL OR BLUEE 16X26IN ST 8 PACK ORB08 16X26ORTWL

## (undated) DEVICE — TUBING PRSS MON L12IN PVC RIG NONEXPANDING M TO FEM CONN

## (undated) DEVICE — MEDI-VAC NON-CONDUCTIVE SUCTION TUBING: Brand: CARDINAL HEALTH

## (undated) DEVICE — BIT DRL DIA1.7MM LNG FT ANK FOR COMPHSVE SYS

## (undated) DEVICE — SOLUTION SURG PREP ANTIMICROBIAL 4 OZ SKIN WND EXIDINE

## (undated) DEVICE — VASC-BAND REG

## (undated) DEVICE — SOLUTION IRRIG 1000ML 09% SOD CHL USP PIC PLAS CONTAINER

## (undated) DEVICE — BANDAGE COMPR W4XL108IN WHT LAYERED NO CLSR SYN RUB ESMARCH

## (undated) DEVICE — SOLUTION IV IRRIG POUR BRL 0.9% SODIUM CHL 2F7124

## (undated) DEVICE — DRESSING GZ XRFRM 4X4(25/BX 6BX/CS)

## (undated) DEVICE — CANNULA NSL CANN NSL L25FT TBNG AD O2 SUP SFT UC